# Patient Record
Sex: FEMALE | Race: WHITE | NOT HISPANIC OR LATINO | Employment: FULL TIME | URBAN - METROPOLITAN AREA
[De-identification: names, ages, dates, MRNs, and addresses within clinical notes are randomized per-mention and may not be internally consistent; named-entity substitution may affect disease eponyms.]

---

## 2017-01-06 ENCOUNTER — TELEPHONE (OUTPATIENT)
Dept: FAMILY MEDICINE | Facility: CLINIC | Age: 22
End: 2017-01-06

## 2017-01-06 ENCOUNTER — ALLIED HEALTH/NURSE VISIT (OUTPATIENT)
Dept: NURSING | Facility: CLINIC | Age: 22
End: 2017-01-06
Payer: COMMERCIAL

## 2017-01-06 DIAGNOSIS — Z30.42 ENCOUNTER FOR SURVEILLANCE OF INJECTABLE CONTRACEPTIVE: Primary | ICD-10-CM

## 2017-01-06 PROCEDURE — 99207 ZZC NO CHARGE NURSE ONLY: CPT

## 2017-01-06 PROCEDURE — 96372 THER/PROPH/DIAG INJ SC/IM: CPT

## 2017-01-06 RX ORDER — MEDROXYPROGESTERONE ACETATE 150 MG/ML
150 INJECTION, SUSPENSION INTRAMUSCULAR
Qty: 1 ML | Refills: 3 | OUTPATIENT
Start: 2017-01-06 | End: 2017-11-10

## 2017-01-06 NOTE — TELEPHONE ENCOUNTER
Patient came in today for a repeat depo provera injection. When asked if she has any side effects from it last time, she states that she has been feeling nauseous. Patient FYI, want to make sure that it is okay for her to continue this medication.

## 2017-01-06 NOTE — NURSING NOTE
Follow Up Injection    Patient returning during stated date range given at previous visit: Yes      If here at the correct interval:   BP Readings from Last 1 Encounters:   10/21/16 114/78     Wt Readings from Last 1 Encounters:   10/21/16 138 lb 8 oz (62.823 kg)       Last Pap/exam date: DUE FOR ONE      Side effects or problems with last injection?  Nausea, sent telephone messagfe to Dr. Bhatia regarding this.  Date range is given to patient for next dose: March 24-April 7, 2017    See Medication Note for administration information    Staff Sig: Abby Israel CMA

## 2017-03-24 ENCOUNTER — OFFICE VISIT (OUTPATIENT)
Dept: FAMILY MEDICINE | Facility: CLINIC | Age: 22
End: 2017-03-24
Payer: COMMERCIAL

## 2017-03-24 VITALS
DIASTOLIC BLOOD PRESSURE: 60 MMHG | HEART RATE: 81 BPM | TEMPERATURE: 98 F | SYSTOLIC BLOOD PRESSURE: 110 MMHG | WEIGHT: 144 LBS | BODY MASS INDEX: 23.14 KG/M2 | HEIGHT: 66 IN | RESPIRATION RATE: 14 BRPM

## 2017-03-24 DIAGNOSIS — Z23 ENCOUNTER FOR IMMUNIZATION: ICD-10-CM

## 2017-03-24 DIAGNOSIS — Z23 NEED FOR HPV VACCINE: ICD-10-CM

## 2017-03-24 DIAGNOSIS — Z12.4 SCREENING FOR MALIGNANT NEOPLASM OF CERVIX: ICD-10-CM

## 2017-03-24 DIAGNOSIS — Z00.00 ENCOUNTER FOR ROUTINE ADULT HEALTH EXAMINATION WITHOUT ABNORMAL FINDINGS: Primary | ICD-10-CM

## 2017-03-24 DIAGNOSIS — Z11.3 SCREENING EXAMINATION FOR VENEREAL DISEASE: ICD-10-CM

## 2017-03-24 LAB
ALBUMIN UR-MCNC: NEGATIVE MG/DL
APPEARANCE UR: CLEAR
BACTERIA #/AREA URNS HPF: ABNORMAL /HPF
BILIRUB UR QL STRIP: NEGATIVE
CHOLEST SERPL-MCNC: 186 MG/DL
COLOR UR AUTO: YELLOW
ERYTHROCYTE [DISTWIDTH] IN BLOOD BY AUTOMATED COUNT: 12.5 % (ref 10–15)
GLUCOSE SERPL-MCNC: 89 MG/DL (ref 70–99)
GLUCOSE UR STRIP-MCNC: NEGATIVE MG/DL
HCT VFR BLD AUTO: 39.1 % (ref 35–47)
HDLC SERPL-MCNC: 41 MG/DL
HGB BLD-MCNC: 12.8 G/DL (ref 11.7–15.7)
HGB UR QL STRIP: ABNORMAL
KETONES UR STRIP-MCNC: NEGATIVE MG/DL
LDLC SERPL CALC-MCNC: 118 MG/DL
LEUKOCYTE ESTERASE UR QL STRIP: NEGATIVE
MCH RBC QN AUTO: 28.7 PG (ref 26.5–33)
MCHC RBC AUTO-ENTMCNC: 32.7 G/DL (ref 31.5–36.5)
MCV RBC AUTO: 88 FL (ref 78–100)
NITRATE UR QL: NEGATIVE
NONHDLC SERPL-MCNC: 145 MG/DL
PH UR STRIP: 5.5 PH (ref 5–7)
PLATELET # BLD AUTO: 210 10E9/L (ref 150–450)
RBC # BLD AUTO: 4.46 10E12/L (ref 3.8–5.2)
RBC #/AREA URNS AUTO: ABNORMAL /HPF (ref 0–2)
SP GR UR STRIP: 1.02 (ref 1–1.03)
TRIGL SERPL-MCNC: 136 MG/DL
URN SPEC COLLECT METH UR: ABNORMAL
UROBILINOGEN UR STRIP-ACNC: 0.2 EU/DL (ref 0.2–1)
WBC # BLD AUTO: 8.3 10E9/L (ref 4–11)
WBC #/AREA URNS AUTO: ABNORMAL /HPF (ref 0–2)

## 2017-03-24 PROCEDURE — 81001 URINALYSIS AUTO W/SCOPE: CPT | Performed by: FAMILY MEDICINE

## 2017-03-24 PROCEDURE — 99395 PREV VISIT EST AGE 18-39: CPT | Mod: 25 | Performed by: FAMILY MEDICINE

## 2017-03-24 PROCEDURE — 85027 COMPLETE CBC AUTOMATED: CPT | Performed by: FAMILY MEDICINE

## 2017-03-24 PROCEDURE — G0145 SCR C/V CYTO,THINLAYER,RESCR: HCPCS | Performed by: FAMILY MEDICINE

## 2017-03-24 PROCEDURE — 87491 CHLMYD TRACH DNA AMP PROBE: CPT | Performed by: FAMILY MEDICINE

## 2017-03-24 PROCEDURE — 90649 4VHPV VACCINE 3 DOSE IM: CPT | Performed by: FAMILY MEDICINE

## 2017-03-24 PROCEDURE — 80061 LIPID PANEL: CPT | Performed by: FAMILY MEDICINE

## 2017-03-24 PROCEDURE — 87591 N.GONORRHOEAE DNA AMP PROB: CPT | Performed by: FAMILY MEDICINE

## 2017-03-24 PROCEDURE — 36415 COLL VENOUS BLD VENIPUNCTURE: CPT | Performed by: FAMILY MEDICINE

## 2017-03-24 PROCEDURE — 90472 IMMUNIZATION ADMIN EACH ADD: CPT | Performed by: FAMILY MEDICINE

## 2017-03-24 PROCEDURE — 82947 ASSAY GLUCOSE BLOOD QUANT: CPT | Performed by: FAMILY MEDICINE

## 2017-03-24 PROCEDURE — 90471 IMMUNIZATION ADMIN: CPT | Performed by: FAMILY MEDICINE

## 2017-03-24 PROCEDURE — 90715 TDAP VACCINE 7 YRS/> IM: CPT | Performed by: FAMILY MEDICINE

## 2017-03-24 PROCEDURE — 96372 THER/PROPH/DIAG INJ SC/IM: CPT | Performed by: FAMILY MEDICINE

## 2017-03-24 NOTE — NURSING NOTE
"Chief Complaint   Patient presents with     Physical       Initial /60  Pulse 81  Temp 98  F (36.7  C) (Tympanic)  Resp 14  Ht 5' 6\" (1.676 m)  Wt 144 lb (65.3 kg)  BMI 23.24 kg/m2 Estimated body mass index is 23.24 kg/(m^2) as calculated from the following:    Height as of this encounter: 5' 6\" (1.676 m).    Weight as of this encounter: 144 lb (65.3 kg).  Medication Reconciliation: complete     Dolly Rangel CMA      "

## 2017-03-24 NOTE — NURSING NOTE
Screening Questionnaire for Adult Immunization    Are you sick today?   No   Do you have allergies to medications, food, a vaccine component or latex?   No   Have you ever had a serious reaction after receiving a vaccination?   No   Do you have a long-term health problem with heart disease, lung disease, asthma, kidney disease, metabolic disease (e.g. diabetes), anemia, or other blood disorder?   No   Do you have cancer, leukemia, HIV/AIDS, or any other immune system problem?   No   In the past 3 months, have you taken medications that affect  your immune system, such as prednisone, other steroids, or anticancer drugs; drugs for the treatment of rheumatoid arthritis, Crohn s disease, or psoriasis; or have you had radiation treatments?   No   Have you had a seizure, or a brain or other nervous system problem?   No   During the past year, have you received a transfusion of blood or blood     products, or been given immune (gamma) globulin or antiviral drug?   No   For women: Are you pregnant or is there a chance you could become        pregnant during the next month?   No   Have you received any vaccinations in the past 4 weeks?   No     Immunization questionnaire answers were all negative.      MNVFC doesn't apply on this patient    Per orders of Dr. Bhatia, injection of tdap and hpv given by Dolly Rangel. Patient instructed to remain in clinic for 20 minutes afterwards, and to report any adverse reaction to me immediately.       Screening performed by Dolly Rangel on 3/24/2017 at 8:55 AM.    The following medication was given:     MEDICATION: Depo Provera 150mg  ROUTE: IM  SITE: RUQ - Gluteus  DOSE:  1 ml  LOT #: [id number]  :  [employer]   EXPIRATION DATE:  11/2019  NDC#: [address]-2    Dolly Rangel [facility]

## 2017-03-24 NOTE — PROGRESS NOTES
SUBJECTIVE:     CC: Prachi Ross is an 21 year old woman who presents for preventive health visit.     Healthy Habits:    Do you get at least three servings of calcium containing foods daily (dairy, green leafy vegetables, etc.)? yes    Amount of exercise or daily activities, outside of work: 0 day(s) per week    Problems taking medications regularly No    Medication side effects: No    Have you had an eye exam in the past two years? no    Do you see a dentist twice per year? no  Do you have sleep apnea, excessive snoring or daytime drowsiness?no      Today's PHQ-2 Score:   PHQ-2 ( 1999 Pfizer) 3/24/2017 3/5/2016   Q1: Little interest or pleasure in doing things 0 0   Q2: Feeling down, depressed or hopeless 0 0   PHQ-2 Score 0 0       Abuse: Current or Past(Physical, Sexual or Emotional)- No  Do you feel safe in your environment - Yes    Social History   Substance Use Topics     Smoking status: Never Smoker     Smokeless tobacco: Never Used     Alcohol use No     The patient does not drink >3 drinks per day nor >7 drinks per week.    No results for input(s): CHOL, HDL, LDL, TRIG, CHOLHDLRATIO, NHDL in the last 05748 hours.    Reviewed orders with patient.  Reviewed health maintenance and updated orders accordingly - Yes    Mammo Decision Support:  Mammogram not appropriate for this patient based on age.    Pertinent mammograms are reviewed under the imaging tab.  History of abnormal Pap smear: NO - age 21-29 PAP every 3 years recommended    Reviewed and updated as needed this visit by clinical staff  Tobacco  Meds         Reviewed and updated as needed this visit by Provider            ROS:  C: NEGATIVE for fever, chills, change in weight  I: NEGATIVE for worrisome rashes, moles or lesions  E: NEGATIVE for vision changes or irritation  ENT: NEGATIVE for ear, mouth and throat problems  RESP:NEGATIVE for significant cough or SOB and POSITIVE for Hx asthma  B: NEGATIVE for masses, tenderness or discharge  CV:  "NEGATIVE for chest pain, palpitations or peripheral edema  GI: NEGATIVE for nausea, abdominal pain, heartburn, or change in bowel habits  : NEGATIVE for unusual urinary or vaginal symptoms. Periods are regular.  M: NEGATIVE for significant arthralgias or myalgia  N: NEGATIVE for weakness, dizziness or paresthesias  P: NEGATIVE for changes in mood or affect    Problem list, Medication list, Allergies, and Medical/Social/Surgical histories reviewed in [facility] and updated as appropriate.  Labs reviewed in EPIC  OBJECTIVE:     /60  Pulse 81  Temp 98  F (36.7  C) (Tympanic)  Resp 14  Ht 5' 6\" (1.676 m)  Wt 144 lb (65.3 kg)  BMI 23.24 kg/m2  EXAM:  GENERAL: healthy, alert and no distress  EYES: Eyes grossly normal to inspection, PERRL and conjunctivae and sclerae normal  HENT: ear canals and TM's normal, nose and mouth without ulcers or lesions  NECK: no adenopathy, no asymmetry, masses, or scars and thyroid normal to palpation  RESP: lungs clear to auscultation - no rales, rhonchi or wheezes  BREAST: normal without masses, tenderness or nipple discharge and no palpable axillary masses or adenopathy  CV: regular rate and rhythm, normal S1 S2, no S3 or S4, no murmur, click or rub, no peripheral edema and peripheral pulses strong  ABDOMEN: soft, nontender, no hepatosplenomegaly, no masses and bowel sounds normal   (female): normal female external genitalia, normal urethral meatus , vaginal mucosa pink, moist, well rugated, normal cervix, adnexae, and uterus without masses. and Pap smear obtained.  GC/Chlamydia swab obtained from Cx  MS: no gross musculoskeletal defects noted, no edema  SKIN: no suspicious lesions or rashes  NEURO: Normal strength and tone, mentation intact and speech normal  PSYCH: mentation appears normal, affect normal/bright    ASSESSMENT/PLAN:     Prachi was seen today for physical.    Diagnoses and all orders for this visit:    Encounter for routine adult health examination without abnormal " "findings  -     CBC with platelets  -     UA with Microscopic  -     Glucose  -     Lipid panel reflex to direct LDL    Screening examination for venereal disease  -     NEISSERIA GONORRHOEA PCR  -     CHLAMYDIA TRACHOMATIS PCR    Screening for malignant neoplasm of cervix  -     Pap imaged thin layer screen only - recommended age 21 - 24 years    Need for HPV vaccine  -     HUMAN PAPILLOMAVIRUS VACCINE  -     VACCINE ADMINISTRATION, INITIAL    Encounter for immunization  -     TDAP VACCINE (ADACEL)  -     VACCINE ADMINISTRATION, EACH ADDITIONAL        COUNSELING:   Reviewed preventive health counseling, as reflected in patient instructions       Regular exercise       Healthy diet/nutrition       Vaccinated for: Human Papillomavirus and TDAP       Contraception    Pt can get the 2nd and 3rd HPV shots with her next Depo shots     reports that she has never smoked. She has never used smokeless tobacco.    Estimated body mass index is 23.24 kg/(m^2) as calculated from the following:    Height as of this encounter: 5' 6\" (1.676 m).    Weight as of this encounter: 144 lb (65.3 kg).       Counseling Resources:  ATP IV Guidelines  Pooled Cohorts Equation Calculator  Breast Cancer Risk Calculator  FRAX Risk Assessment  ICSI Preventive Guidelines  Dietary Guidelines for Americans, 2010  USDA's MyPlate  ASA Prophylaxis  Lung CA Screening    Tone Bhatia MD  [facility]  "

## 2017-03-24 NOTE — MR AVS SNAPSHOT
After Visit Summary   3/24/2017    Prachi Ross    MRN: [id number]           Patient Information     Date Of Birth          1995        Visit Information        Provider Department      3/24/2017 8:00 AM Tone Bhatia MD [facility]        Today's Diagnoses     Encounter for routine adult health examination without abnormal findings    -  1    Screening examination for venereal disease        Screening for malignant neoplasm of cervix        Need for HPV vaccine        Encounter for immunization          Care Instructions      Preventive Health Recommendations  Female Ages 18 to 25     Yearly exam:     See your health care provider every year in order to  o Review health changes.   o Discuss preventive care.    o Review your medicines if your doctor has prescribed any.      You should be tested each year for STDs (sexually transmitted diseases).       After age 20, talk to your provider about how often you should have cholesterol testing.      Starting at age 21, get a Pap test every three years. If you have an abnormal result, your doctor may have you test more often.      If you are at risk for diabetes, you should have a diabetes test (fasting glucose).     Shots:     Get a flu shot each year.     Get a tetanus shot every 10 years.     Consider getting the shot (vaccine) that prevents cervical cancer (Gardasil).    Nutrition:     Eat at least 5 servings of fruits and vegetables each day.    Eat whole-grain bread, whole-wheat pasta and brown rice instead of white grains and rice.    Talk to your provider about Calcium and Vitamin D.     Lifestyle    Exercise at least 150 minutes a week each week (30 minutes a day, 5 days a week). This will help you control your weight and prevent disease.    Limit alcohol to one drink per day.    No smoking.     Wear sunscreen to prevent skin cancer.    See your dentist every six months for an exam and cleaning.        Follow-ups  "after your visit        Follow-up notes from your care team     Return in about 1 year (around 3/24/2018).      Who to contact     If you have questions or need follow up information about today's clinic visit or your schedule please contact [facility] directly at [phone].  Normal or non-critical lab and imaging results will be communicated to you by MyChart, letter or phone within 4 business days after the clinic has received the results. If you do not hear from us within 7 days, please contact the clinic through MyChart or phone. If you have a critical or abnormal lab result, we will notify you by phone as soon as possible.  Submit refill requests through GigMasters or call your pharmacy and they will forward the refill request to us. Please allow 3 business days for your refill to be completed.          Additional Information About Your Visit        MyChart Information     GigMasters lets you send messages to your doctor, view your test results, renew your prescriptions, schedule appointments and more. To sign up, go to [email or web address] . Click on \"Log in\" on the left side of the screen, which will take you to the Welcome page. Then click on \"Sign up Now\" on the right side of the page.     You will be asked to enter the access code listed below, as well as some personal information. Please follow the directions to create your username and password.     Your access code is: [email or web address]  Expires: 2017  8:33 AM     Your access code will  in 90 days. If you need help or a new code, please call your [facility] or [phone].        Care EveryWhere ID     This is your Care EveryWhere ID. This could be used by other organizations to access your [place] medical records  [id number]        Your Vitals Were     Pulse Temperature Respirations Height BMI (Body Mass Index)       81 98  F (36.7  C) (Tympanic) 14 5' 6\" (1.676 m) 23.24 kg/m2        Blood Pressure from Last " 3 Encounters:   03/24/17 110/60   10/21/16 114/78   05/20/16 110/62    Weight from Last 3 Encounters:   03/24/17 144 lb (65.3 kg)   10/21/16 138 lb 8 oz (62.8 kg)   05/20/16 124 lb (56.2 kg)              We Performed the Following     CBC with platelets     CHLAMYDIA TRACHOMATIS PCR     Glucose     HUMAN PAPILLOMAVIRUS VACCINE     Lipid panel reflex to direct LDL     NEISSERIA GONORRHOEA PCR     Pap imaged thin layer screen only - recommended age 21 - 24 years     TDAP VACCINE (ADACEL)     UA with Microscopic     VACCINE ADMINISTRATION, EACH ADDITIONAL     VACCINE ADMINISTRATION, INITIAL        Primary Care Provider Office Phone # Fax #    Tone Bhatia [phone]       [facility] [address] [facility] [address]        Thank you!     Thank you for choosing [facility]  for your care. Our goal is always to provide you with excellent care. Hearing back from our patients is one way we can continue to improve our services. Please take a few minutes to complete the written survey that you may receive in the mail after your visit with us. Thank you!             Your Updated Medication List - Protect others around you: Learn how to safely use, store and throw away your medicines at www.disposemymeds.org.          This list is accurate as of: 3/24/17  8:33 AM.  Always use your most recent med list.                   Brand Name Dispense Instructions for use    albuterol 108 (90 BASE) MCG/ACT Inhaler   Generic drug:  albuterol          medroxyPROGESTERone 150 MG/ML injection    DEPO-PROVERA    1 mL    Inject 1 mL (150 mg) into the muscle every 3 months

## 2017-03-27 LAB
C TRACH DNA SPEC QL NAA+PROBE: NORMAL
N GONORRHOEA DNA SPEC QL NAA+PROBE: NORMAL
SPECIMEN SOURCE: NORMAL
SPECIMEN SOURCE: NORMAL

## 2017-03-28 LAB
COPATH REPORT: NORMAL
PAP: NORMAL

## 2017-06-09 ENCOUNTER — ALLIED HEALTH/NURSE VISIT (OUTPATIENT)
Dept: NURSING | Facility: CLINIC | Age: 22
End: 2017-06-09
Payer: COMMERCIAL

## 2017-06-09 PROCEDURE — 99207 ZZC NO CHARGE NURSE ONLY: CPT

## 2017-06-09 PROCEDURE — 96372 THER/PROPH/DIAG INJ SC/IM: CPT

## 2017-06-09 NOTE — MR AVS SNAPSHOT
After Visit Summary   6/9/2017    Prachi Ross    MRN: [id number]           Patient Information     Date Of Birth          1995        Visit Information        Provider Department      6/9/2017 10:00 AM BX NURSE [facility]        Today's Diagnoses     Contraception    -  1       Follow-ups after your visit        Who to contact     If you have questions or need follow up information about today's clinic visit or your schedule please contact [facility] directly at [phone].  Normal or non-critical lab and imaging results will be communicated to you by MyChart, letter or phone within 4 business days after the clinic has received the results. If you do not hear from us within 7 days, please contact the clinic through VideoBurstt or phone. If you have a critical or abnormal lab result, we will notify you by phone as soon as possible.  Submit refill requests through Docker or call your pharmacy and they will forward the refill request to us. Please allow 3 business days for your refill to be completed.          Additional Information About Your Visit        MyChart Information     Docker gives you secure access to your electronic health record. If you see a primary care provider, you can also send messages to your care team and make appointments. If you have questions, please call your primary care clinic.  If you do not have a primary care provider, please call [phone] and they will assist you.        Care EveryWhere ID     This is your Care EveryWhere ID. This could be used by other organizations to access your [place] medical records  [id number]         Blood Pressure from Last 3 Encounters:   03/24/17 110/60   10/21/16 114/78   05/20/16 110/62    Weight from Last 3 Encounters:   03/24/17 144 lb (65.3 kg)   10/21/16 138 lb 8 oz (62.8 kg)   05/20/16 124 lb (56.2 kg)              We Performed the Following     THER/PROPH/DIAG INJ,  SC/IM        Primary Care Provider Office Phone # Fax #    Tone Bhatia [phone]       [facility] [address] [address] [facility] [address]        Thank you!     Thank you for choosing [facility]  for your care. Our goal is always to provide you with excellent care. Hearing back from our patients is one way we can continue to improve our services. Please take a few minutes to complete the written survey that you may receive in the mail after your visit with us. Thank you!             Your Updated Medication List - Protect others around you: Learn how to safely use, store and throw away your medicines at www.disposemymeds.org.          This list is accurate as of: 6/9/17 10:02 AM.  Always use your most recent med list.                   Brand Name Dispense Instructions for use    albuterol 108 (90 BASE) MCG/ACT Inhaler   Generic drug:  albuterol          medroxyPROGESTERone 150 MG/ML injection    DEPO-PROVERA    1 mL    Inject 1 mL (150 mg) into the muscle every 3 months

## 2017-06-09 NOTE — PROGRESS NOTES
Follow Up Injection    Patient returning during stated date range given at previous visit: Yes      If here at the correct interval:   BP Readings from Last 1 Encounters:   03/24/17 110/60     Wt Readings from Last 1 Encounters:   03/24/17 144 lb (65.3 kg)       Last Pap/exam date: 3/24/17      Side effects or problems with last injection?  No.  Date range is given to patient for next dose: August 25-September 8th 2017    See Medication Note for administration information    Staff Sig: Abby Israel CMA

## 2017-08-25 ENCOUNTER — ALLIED HEALTH/NURSE VISIT (OUTPATIENT)
Dept: NURSING | Facility: CLINIC | Age: 22
End: 2017-08-25
Payer: COMMERCIAL

## 2017-08-25 DIAGNOSIS — Z30.42 ENCOUNTER FOR SURVEILLANCE OF INJECTABLE CONTRACEPTIVE: Primary | ICD-10-CM

## 2017-08-25 PROCEDURE — 99207 ZZC NO CHARGE NURSE ONLY: CPT

## 2017-08-25 PROCEDURE — 96372 THER/PROPH/DIAG INJ SC/IM: CPT

## 2017-08-25 NOTE — MR AVS SNAPSHOT
After Visit Summary   8/25/2017    Prachi Ross    MRN: [id number]           Patient Information     Date Of Birth          1995        Visit Information        Provider Department      8/25/2017 4:00 PM BX NURSE [facility]        Today's Diagnoses     Encounter for surveillance of injectable contraceptive    -  1       Follow-ups after your visit        Who to contact     If you have questions or need follow up information about today's clinic visit or your schedule please contact [facility] directly at [phone].  Normal or non-critical lab and imaging results will be communicated to you by Divergencehart, letter or phone within 4 business days after the clinic has received the results. If you do not hear from us within 7 days, please contact the clinic through Jobydut or phone. If you have a critical or abnormal lab result, we will notify you by phone as soon as possible.  Submit refill requests through Define My Style or call your pharmacy and they will forward the refill request to us. Please allow 3 business days for your refill to be completed.          Additional Information About Your Visit        MyChart Information     Define My Style gives you secure access to your electronic health record. If you see a primary care provider, you can also send messages to your care team and make appointments. If you have questions, please call your primary care clinic.  If you do not have a primary care provider, please call [phone] and they will assist you.        Care EveryWhere ID     This is your Care EveryWhere ID. This could be used by other organizations to access your [place] medical records  [id number]         Blood Pressure from Last 3 Encounters:   03/24/17 110/60   10/21/16 114/78   05/20/16 110/62    Weight from Last 3 Encounters:   03/24/17 144 lb (65.3 kg)   10/21/16 138 lb 8 oz (62.8 kg)   05/20/16 124 lb (56.2 kg)              We  Performed the Following     THER/PROPH/DIAG INJ, SC/IM        Primary Care Provider Office Phone # Fax #    Tone Bhatia [phone] [address] [facility] [address]        Equal Access to Services     LUIS ENRIQUE GEIGER : Hadii aad ku hadasho Soomaali, waaxda luqadaha, qaybta kaalmada adeegyada, waxay idiin hayaan adeeg khlizetchris lameghan . So [facility] [phone].    ATENCIÓN: Si habla español, tiene a naranjo disposición servicios gratuitos de asistencia lingüística. Llame al [phone].    We comply with applicable federal civil rights laws and [place] laws. We do not discriminate on the basis of race, color, national origin, age, disability sex, sexual orientation or gender identity.            Thank you!     Thank you for choosing [facility]  for your care. Our goal is always to provide you with excellent care. Hearing back from our patients is one way we can continue to improve our services. Please take a few minutes to complete the written survey that you may receive in the mail after your visit with us. Thank you!             Your Updated Medication List - Protect others around you: Learn how to safely use, store and throw away your medicines at www.disposemymeds.org.          This list is accurate as of: 8/25/17 11:59 PM.  Always use your most recent med list.                   Brand Name Dispense Instructions for use Diagnosis    medroxyPROGESTERone 150 MG/ML injection    DEPO-PROVERA    1 mL    Inject 1 mL (150 mg) into the muscle every 3 months    Encounter for surveillance of injectable contraceptive       PROAIR  (90 BASE) MCG/ACT Inhaler   Generic drug:  albuterol

## 2017-08-26 NOTE — PROGRESS NOTES
Follow Up Injection    Patient returning during stated date range given at previous visit: Yes      If here at the correct interval:   BP Readings from Last 1 Encounters:   03/24/17 110/60     Wt Readings from Last 1 Encounters:   03/24/17 144 lb (65.3 kg)             Side effects or problems with last injection?  No.  Date range is given to patient for next dose: 11/10/17-11/24/17    See Medication Note for administration information    Staff Sig: Dang Newell CMA

## 2017-11-10 ENCOUNTER — OFFICE VISIT (OUTPATIENT)
Dept: FAMILY MEDICINE | Facility: CLINIC | Age: 22
End: 2017-11-10
Payer: COMMERCIAL

## 2017-11-10 ENCOUNTER — TELEPHONE (OUTPATIENT)
Dept: FAMILY MEDICINE | Facility: CLINIC | Age: 22
End: 2017-11-10

## 2017-11-10 VITALS
SYSTOLIC BLOOD PRESSURE: 110 MMHG | HEART RATE: 82 BPM | TEMPERATURE: 97.8 F | WEIGHT: 156 LBS | DIASTOLIC BLOOD PRESSURE: 60 MMHG | BODY MASS INDEX: 25.18 KG/M2 | RESPIRATION RATE: 14 BRPM

## 2017-11-10 DIAGNOSIS — N76.0 BACTERIAL VAGINOSIS: Primary | ICD-10-CM

## 2017-11-10 DIAGNOSIS — N94.10 DYSPAREUNIA, FEMALE: Primary | ICD-10-CM

## 2017-11-10 DIAGNOSIS — B96.89 BACTERIAL VAGINOSIS: Primary | ICD-10-CM

## 2017-11-10 DIAGNOSIS — Z30.42 ENCOUNTER FOR SURVEILLANCE OF INJECTABLE CONTRACEPTIVE: ICD-10-CM

## 2017-11-10 LAB
SPECIMEN SOURCE: ABNORMAL
WET PREP SPEC: ABNORMAL

## 2017-11-10 PROCEDURE — 87591 N.GONORRHOEAE DNA AMP PROB: CPT | Performed by: PHYSICIAN ASSISTANT

## 2017-11-10 PROCEDURE — 87210 SMEAR WET MOUNT SALINE/INK: CPT | Performed by: PHYSICIAN ASSISTANT

## 2017-11-10 PROCEDURE — 99213 OFFICE O/P EST LOW 20 MIN: CPT | Performed by: PHYSICIAN ASSISTANT

## 2017-11-10 PROCEDURE — 87491 CHLMYD TRACH DNA AMP PROBE: CPT | Performed by: PHYSICIAN ASSISTANT

## 2017-11-10 RX ORDER — MEDROXYPROGESTERONE ACETATE 150 MG/ML
150 INJECTION, SUSPENSION INTRAMUSCULAR
Qty: 1 ML | Refills: 3 | OUTPATIENT
Start: 2017-11-10 | End: 2022-10-04

## 2017-11-10 RX ORDER — METRONIDAZOLE 500 MG/1
500 TABLET ORAL 2 TIMES DAILY
Qty: 14 TABLET | Refills: 0 | Status: SHIPPED | OUTPATIENT
Start: 2017-11-10 | End: 2018-07-06

## 2017-11-10 NOTE — NURSING NOTE
The following medication was given:     MEDICATION: Depo Provera 150mg  ROUTE: IM  SITE: [facility]  DOSE: 1 ml  LOT #: [id number]  :  Betsey  EXPIRATION DATE:  4/1/19  NDC#: 6896-5373-13    Dolly Rangel CMA

## 2017-11-10 NOTE — NURSING NOTE
"Chief Complaint   Patient presents with     Contraception     depo provera        Initial /60  Pulse 82  Temp 97.8  F (36.6  C) (Tympanic)  Resp 14  Wt 156 lb (70.8 kg)  BMI 25.18 kg/m2 Estimated body mass index is 25.18 kg/(m^2) as calculated from the following:    Height as of 3/24/17: 5' 6\" (1.676 m).    Weight as of this encounter: 156 lb (70.8 kg).  Medication Reconciliation: complete     Dolly Rangel CMA         "

## 2017-11-10 NOTE — MR AVS SNAPSHOT
After Visit Summary   11/10/2017    Prachi Ross    MRN: [id number]           Patient Information     Date Of Birth          1995        Visit Information        Provider Department      11/10/2017 4:10 PM Siegler, Nicole Joy, PA-C [facility]        Today's Diagnoses     Dyspareunia, female    -  1    Encounter for surveillance of injectable contraceptive           Follow-ups after your visit        Who to contact     If you have questions or need follow up information about today's clinic visit or your schedule please contact [facility] directly at [phone].  Normal or non-critical lab and imaging results will be communicated to you by Eventyardhart, letter or phone within 4 business days after the clinic has received the results. If you do not hear from us within 7 days, please contact the clinic through Eventyardhart or phone. If you have a critical or abnormal lab result, we will notify you by phone as soon as possible.  Submit refill requests through [employer] or call your pharmacy and they will forward the refill request to us. Please allow 3 business days for your refill to be completed.          Additional Information About Your Visit        MyChart Information     [employer] gives you secure access to your electronic health record. If you see a primary care provider, you can also send messages to your care team and make appointments. If you have questions, please call your primary care clinic.  If you do not have a primary care provider, please call [phone] and they will assist you.        Care EveryWhere ID     This is your Care EveryWhere ID. This could be used by other organizations to access your [place] medical records  [id number]        Your Vitals Were     Pulse Temperature Respirations BMI (Body Mass Index)          82 97.8  F (36.6  C) (Tympanic) 14 25.18 kg/m2         Blood Pressure from Last 3 Encounters:   11/10/17 110/60    03/24/17 110/60   10/21/16 114/78    Weight from Last 3 Encounters:   11/10/17 156 lb (70.8 kg)   03/24/17 144 lb (65.3 kg)   10/21/16 138 lb 8 oz (62.8 kg)              We Performed the Following     Chlamydia trachomatis PCR     Neisseria gonorrhoeae PCR     Wet prep          Where to get your medicines      Some of these will need a paper prescription and others can be bought over the counter.  Ask your nurse if you have questions.     You don't need a prescription for these medications     medroxyPROGESTERone 150 MG/ML injection          Primary Care Provider Office Phone # Fax #    Tone Bhatia [phone]       [address] [facility] [address]        Equal Access to Services     LUIS ENRIQUE GEIGER : Raoul dawkinso Soconnor, waaxda luqadaha, qaybta kaalmada adecelesteyajarred, pauline cobos . So [facility] [phone].    ATENCIÓN: Si habla español, tiene a naranjo disposición servicios gratuitos de asistencia lingüística. Llame al [phone].    We comply with applicable federal civil rights laws and [place] laws. We do not discriminate on the basis of race, color, national origin, age, disability, sex, sexual orientation, or gender identity.            Thank you!     Thank you for choosing [facility]  for your care. Our goal is always to provide you with excellent care. Hearing back from our patients is one way we can continue to improve our services. Please take a few minutes to complete the written survey that you may receive in the mail after your visit with us. Thank you!             Your Updated Medication List - Protect others around you: Learn how to safely use, store and throw away your medicines at www.disposemymeds.org.          This list is accurate as of: 11/10/17  4:37 PM.  Always use your most recent med list.                   Brand Name Dispense Instructions for use Diagnosis    medroxyPROGESTERone 150 MG/ML injection     DEPO-PROVERA    1 mL    Inject 1 mL (150 mg) into the muscle every 3 months    Encounter for surveillance of injectable contraceptive       PROAIR  (90 BASE) MCG/ACT Inhaler   Generic drug:  albuterol

## 2017-11-10 NOTE — PROGRESS NOTES
SUBJECTIVE:   Prachi Ross is a 22 year old female who presents to clinic today for the following health issues:    Vaginal Symptoms      Duration: 6 months    Description  pain with intercourse    Intensity:  moderate    Accompanying signs and symptoms (fever/dysuria/abdominal or back pain): None    History  Sexually active: yes, single partner, contraception - Depo Provera  Possibility of pregnancy: No  Recent antibiotic use: no     Precipitating or alleviating factors: None  Therapies tried and outcome: none      Pain in with intercourse inside the vagina that is aching and sore only during intercourse. Immediately afterward she does not have any pain. She has had no bleeding. No menstrual cycles due to depo use. No change with position change. No pelvic or back pain. She does feel like she is a little bit dry inside. She has used lubricants and it did at first but it seems to dry out very quickly.     She has a vaginal odor without discharge that she has noted.   She is sexually active with one male partner and no known exposure to STD.       Problem list and histories reviewed & adjusted, as indicated.  Additional history: as documented    Patient Active Problem List   Diagnosis     CARDIOVASCULAR SCREENING; LDL GOAL LESS THAN 160     Encounter for surveillance of injectable contraceptive     History reviewed. No pertinent surgical history.    Social History   Substance Use Topics     Smoking status: Never Smoker     Smokeless tobacco: Never Used     Alcohol use 0.0 oz/week     0 Standard drinks or equivalent per week     Family History   Problem Relation Age of Onset     Hypertension Mother      Coronary Artery Disease Maternal Grandmother      DIABETES No family hx of      Hyperlipidemia No family hx of      CEREBROVASCULAR DISEASE No family hx of      Breast Cancer No family hx of      Colon Cancer No family hx of      Prostate Cancer No family hx of      Other Cancer No family hx of      Depression No  family hx of      Anxiety Disorder No family hx of      MENTAL ILLNESS No family hx of      Substance Abuse No family hx of      Anesthesia Reaction No family hx of      Asthma No family hx of      OSTEOPOROSIS No family hx of      Genetic Disorder No family hx of      Thyroid Disease No family hx of      Obesity No family hx of      Unknown/Adopted No family hx of          Current Outpatient Prescriptions   Medication Sig Dispense Refill     medroxyPROGESTERone (DEPO-PROVERA) 150 MG/ML injection Inject 1 mL (150 mg) into the muscle every 3 months 1 mL 3     ALBUTEROL 108 (90 BASE) MCG/ACT inhaler   7     [DISCONTINUED] medroxyPROGESTERone (DEPO-PROVERA) 150 MG/ML injection Inject 1 mL (150 mg) into the muscle every 3 months 1 mL 3         Reviewed and updated as needed this visit by clinical staffTobacco  Meds  Med Hx  Surg Hx  Fam Hx  Soc Hx      Reviewed and updated as needed this visit by Provider         ROS:  Constitutional, HEENT, cardiovascular, pulmonary, gi and gu systems are negative, except as otherwise noted.      OBJECTIVE:   /60  Pulse 82  Temp 97.8  F (36.6  C) (Tympanic)  Resp 14  Wt 156 lb (70.8 kg)  BMI 25.18 kg/m2  Body mass index is 25.18 kg/(m^2).  GENERAL: healthy, alert and no distress  ABDOMEN: soft, nontender, no hepatosplenomegaly, no masses and bowel sounds normal   (female): normal female external genitalia, normal urethral meatus, vaginal mucosa, normal cervix/adnexa/uterus without masses. Scant white, curdy discharge. She did experience discomfort with insertion of the speculum in the vaginal introitus and on the vaginal wall  SKIN: no suspicious lesions or rashes  BACK: no CVA tenderness, no paralumbar tenderness  PSYCH: mentation appears normal, affect normal/bright    Diagnostic Test Results:  pending    ASSESSMENT/PLAN:       ICD-10-CM    1. Dyspareunia, female N94.10 Wet prep     Neisseria gonorrhoeae PCR     Chlamydia trachomatis PCR   2. Encounter for  surveillance of injectable contraceptive Z30.42 medroxyPROGESTERone (DEPO-PROVERA) 150 MG/ML injection       Depo given today. No contraindications.     Pending labs regarding her dyspareunia. We will discuss any findings that are helpful, if nothing found will consider sending for further eval with ob/gyn. She will continue use of personal lubricants and dry several different ones to see if there are any that work better for her. She agrees.     Nicole Joy Siegler, PA-C  [facility]

## 2017-11-10 NOTE — TELEPHONE ENCOUNTER
Clue cells noted on wet prep after patient left the clinic. This does correlate somewhat with her symptoms. LVM with this information and that I sent a script to her pharmacy. Nicole Joy Siegler, PA-C

## 2017-11-12 LAB
C TRACH DNA SPEC QL NAA+PROBE: NEGATIVE
N GONORRHOEA DNA SPEC QL NAA+PROBE: NEGATIVE
SPECIMEN SOURCE: NORMAL
SPECIMEN SOURCE: NORMAL

## 2018-01-26 ENCOUNTER — ALLIED HEALTH/NURSE VISIT (OUTPATIENT)
Dept: NURSING | Facility: CLINIC | Age: 23
End: 2018-01-26
Payer: COMMERCIAL

## 2018-01-26 VITALS — WEIGHT: 158 LBS | DIASTOLIC BLOOD PRESSURE: 60 MMHG | SYSTOLIC BLOOD PRESSURE: 94 MMHG | BODY MASS INDEX: 25.5 KG/M2

## 2018-01-26 DIAGNOSIS — Z30.9 CONTRACEPTIVE MANAGEMENT: Primary | ICD-10-CM

## 2018-01-26 PROCEDURE — 96372 THER/PROPH/DIAG INJ SC/IM: CPT

## 2018-01-26 PROCEDURE — 99207 ZZC NO CHARGE LOS: CPT

## 2018-01-26 NOTE — MR AVS SNAPSHOT
After Visit Summary   1/26/2018    Prachi Ross    MRN: [id number]           Patient Information     Date Of Birth          1995        Visit Information        Provider Department      1/26/2018 4:00 PM BX NURSE [facility]        Today's Diagnoses     Contraceptive management    -  1       Follow-ups after your visit        Who to contact     If you have questions or need follow up information about today's clinic visit or your schedule please contact [facility] directly at [phone].  Normal or non-critical lab and imaging results will be communicated to you by Hybrenthart, letter or phone within 4 business days after the clinic has received the results. If you do not hear from us within 7 days, please contact the clinic through [employer] or phone. If you have a critical or abnormal lab result, we will notify you by phone as soon as possible.  Submit refill requests through [employer] or call your pharmacy and they will forward the refill request to us. Please allow 3 business days for your refill to be completed.          Additional Information About Your Visit        MyChart Information     [employer] gives you secure access to your electronic health record. If you see a primary care provider, you can also send messages to your care team and make appointments. If you have questions, please call your primary care clinic.  If you do not have a primary care provider, please call [phone] and they will assist you.        Care EveryWhere ID     This is your Care EveryWhere ID. This could be used by other organizations to access your [place] medical records  [id number]        Your Vitals Were     BMI (Body Mass Index)                   25.5 kg/m2            Blood Pressure from Last 3 Encounters:   01/26/18 94/60   11/10/17 110/60   03/24/17 110/60    Weight from Last 3 Encounters:   01/26/18 158 lb (71.7 kg)   11/10/17 156 lb (70.8 kg)    03/24/17 144 lb (65.3 kg)              We Performed the Following     C Medroxyprogesterone inj/1mg (J-Code)     THER/PROPH/DIAG INJ, SC/IM        Primary Care Provider Office Phone # Fax #    Tone Bhatia [phone] [address] [facility] [address]        Equal Access to Services     SUNITA [facility] : Hadii aad ku hadasho Soomaali, waaxda luqadaha, qaybta kaalmada adeegyada, waxay idiin hayaan adeeg kharash la'aan ah. So [facility] [phone].    ATENCIÓN: Si habla esphéctor, tiene a naranjo disposición servicios gratuitos de asistencia lingüística. Llame al [phone].    We comply with applicable federal civil rights laws and [place] laws. We do not discriminate on the basis of race, color, national origin, age, disability, sex, sexual orientation, or gender identity.            Thank you!     Thank you for choosing [facility]  for your care. Our goal is always to provide you with excellent care. Hearing back from our patients is one way we can continue to improve our services. Please take a few minutes to complete the written survey that you may receive in the mail after your visit with us. Thank you!             Your Updated Medication List - Protect others around you: Learn how to safely use, store and throw away your medicines at www.disposemymeds.org.          This list is accurate as of 1/26/18  4:20 PM.  Always use your most recent med list.                   Brand Name Dispense Instructions for use Diagnosis    medroxyPROGESTERone 150 MG/ML injection    DEPO-PROVERA    1 mL    Inject 1 mL (150 mg) into the muscle every 3 months    Encounter for surveillance of injectable contraceptive       metroNIDAZOLE 500 MG tablet    FLAGYL    14 tablet    Take 1 tablet (500 mg) by mouth 2 times daily    Bacterial vaginosis       PROAIR  (90 BASE) MCG/ACT Inhaler   Generic drug:  albuterol

## 2018-01-26 NOTE — PROGRESS NOTES
Follow Up Injection    Patient returning during stated date range given at previous visit: Yes      If here at the correct interval:   BP Readings from Last 1 Encounters:   01/26/18 94/60     Wt Readings from Last 1 Encounters:   01/26/18 158 lb (71.7 kg)       Last Pap/exam date: 3/24/17      Side effects or problems with last injection?  No.  Date range is given to patient for next dose: April 13-27    See Medication Note for administration information    Staff Sig: Dolly Rangel CMA

## 2018-01-26 NOTE — NURSING NOTE
The following medication was given:     MEDICATION: Depo Provera 150mg  ROUTE: IM  SITE: [facility]  DOSE: 1 ml  LOT #: [id number]  :  Betsey  EXPIRATION DATE:  6/2019  NDC#: 3639-7955-70    Dolly Rangel CMA

## 2018-04-13 ENCOUNTER — ALLIED HEALTH/NURSE VISIT (OUTPATIENT)
Dept: NURSING | Facility: CLINIC | Age: 23
End: 2018-04-13
Payer: COMMERCIAL

## 2018-04-13 VITALS — SYSTOLIC BLOOD PRESSURE: 100 MMHG | BODY MASS INDEX: 26.15 KG/M2 | WEIGHT: 162 LBS | DIASTOLIC BLOOD PRESSURE: 60 MMHG

## 2018-04-13 PROCEDURE — 99207 ZZC NO CHARGE LOS: CPT

## 2018-04-13 PROCEDURE — 96372 THER/PROPH/DIAG INJ SC/IM: CPT

## 2018-04-13 NOTE — PROGRESS NOTES
Follow Up Injection    Patient returning during stated date range given at previous visit: Yes      If here at the correct interval:   BP Readings from Last 1 Encounters:   04/13/18 100/60     Wt Readings from Last 1 Encounters:   04/13/18 162 lb (73.5 kg)       Last Pap/exam date: 11/10/17      Side effects or problems with last injection?  No.  Date range is given to patient for next dose: June 29th-July 13th    See Medication Note for administration information    Staff Sig: Dolly Rangel CMA

## 2018-04-13 NOTE — MR AVS SNAPSHOT
After Visit Summary   4/13/2018    Prachi Ross    MRN: [id number]           Patient Information     Date Of Birth          1995        Visit Information        Provider Department      4/13/2018 4:00 PM BX NURSE [facility]        Today's Diagnoses     Contraception    -  1       Follow-ups after your visit        Who to contact     If you have questions or need follow up information about today's clinic visit or your schedule please contact [facility] directly at [phone].  Normal or non-critical lab and imaging results will be communicated to you by Integrated Development Enterprisehart, letter or phone within 4 business days after the clinic has received the results. If you do not hear from us within 7 days, please contact the clinic through Dat[employer]ogt or phone. If you have a critical or abnormal lab result, we will notify you by phone as soon as possible.  Submit refill requests through [employer] or call your pharmacy and they will forward the refill request to us. Please allow 3 business days for your refill to be completed.          Additional Information About Your Visit        MyChart Information     [employer] gives you secure access to your electronic health record. If you see a primary care provider, you can also send messages to your care team and make appointments. If you have questions, please call your primary care clinic.  If you do not have a primary care provider, please call [phone] and they will assist you.        Care EveryWhere ID     This is your Care EveryWhere ID. This could be used by other organizations to access your [place] medical records  [id number]        Your Vitals Were     BMI (Body Mass Index)                   26.15 kg/m2            Blood Pressure from Last 3 Encounters:   04/13/18 100/60   01/26/18 94/60   11/10/17 110/60    Weight from Last 3 Encounters:   04/13/18 162 lb (73.5 kg)   01/26/18 158 lb (71.7 kg)   11/10/17 156  lb (70.8 kg)              We Performed the Following     Medroxyprogesterone inj/1mg (Depo Provera J-Code)     THER/PROPH/DIAG INJ, SC/IM        Primary Care Provider Office Phone # Fax #    Tone Bhatia [phone] [address] [facility] [address]        Equal Access to Services     [facility] : Hadii aad ku hadasho Soomaali, waaxda luqadaha, qaybta kaalmada adeegyada, waxay idiin hayaan adeeg kharash la'aan . So [facility] [phone].    ATENCIÓN: Si habla español, tiene a naranjo disposición servicios gratuitos de asistencia lingüística. Thad al [phone].    We comply with applicable federal civil rights laws and [place] laws. We do not discriminate on the basis of race, color, national origin, age, disability, sex, sexual orientation, or gender identity.            Thank you!     Thank you for choosing [facility]  for your care. Our goal is always to provide you with excellent care. Hearing back from our patients is one way we can continue to improve our services. Please take a few minutes to complete the written survey that you may receive in the mail after your visit with us. Thank you!             Your Updated Medication List - Protect others around you: Learn how to safely use, store and throw away your medicines at www.disposemymeds.org.          This list is accurate as of 4/13/18  4:30 PM.  Always use your most recent med list.                   Brand Name Dispense Instructions for use Diagnosis    medroxyPROGESTERone 150 MG/ML injection    DEPO-PROVERA    1 mL    Inject 1 mL (150 mg) into the muscle every 3 months    Encounter for surveillance of injectable contraceptive       metroNIDAZOLE 500 MG tablet    FLAGYL    14 tablet    Take 1 tablet (500 mg) by mouth 2 times daily    Bacterial vaginosis       PROAIR  (90 Base) MCG/ACT Inhaler   Generic drug:  albuterol

## 2018-04-13 NOTE — NURSING NOTE
The following medication was given:     MEDICATION: Depo Provera 150mg  ROUTE: IM  SITE: Vastus Lateralis - Left  DOSE: 1 ml  LOT #: [id number]  :  Intio   EXPIRATION DATE:  5/2020  NDC#: [address]-9    Dolly Rangel CMA

## 2018-05-18 ENCOUNTER — TRANSFERRED RECORDS (OUTPATIENT)
Dept: HEALTH INFORMATION MANAGEMENT | Facility: CLINIC | Age: 23
End: 2018-05-18

## 2018-07-06 ENCOUNTER — OFFICE VISIT (OUTPATIENT)
Dept: OBGYN | Facility: CLINIC | Age: 23
End: 2018-07-06
Payer: COMMERCIAL

## 2018-07-06 VITALS — BODY MASS INDEX: 26.79 KG/M2 | WEIGHT: 166 LBS | DIASTOLIC BLOOD PRESSURE: 80 MMHG | SYSTOLIC BLOOD PRESSURE: 100 MMHG

## 2018-07-06 DIAGNOSIS — Z30.42 ENCOUNTER FOR DEPO-PROVERA CONTRACEPTION: ICD-10-CM

## 2018-07-06 DIAGNOSIS — R10.2 VAGINAL PAIN: Primary | ICD-10-CM

## 2018-07-06 LAB
SPECIMEN SOURCE: NORMAL
WET PREP SPEC: NORMAL

## 2018-07-06 PROCEDURE — 87210 SMEAR WET MOUNT SALINE/INK: CPT | Performed by: ADVANCED PRACTICE MIDWIFE

## 2018-07-06 PROCEDURE — 99203 OFFICE O/P NEW LOW 30 MIN: CPT | Mod: 25 | Performed by: ADVANCED PRACTICE MIDWIFE

## 2018-07-06 PROCEDURE — 96372 THER/PROPH/DIAG INJ SC/IM: CPT | Performed by: ADVANCED PRACTICE MIDWIFE

## 2018-07-06 NOTE — PROGRESS NOTES
SUBJECTIVE:                                                   Prachi Ross is a 22 year old who presents to clinic today for the following health issue(s):  Patient presents with:  Imm/Inj: Due for depo  Vaginal Problem: Pain with intercourse    States that 2 months ago she was diagnosed with bacterial vaginosis and thinks it may have returned.  States she has pain with intercourse.  Denies any bleeding with intercourse.  Denies any changes in vaginal discharge or odor.  Patient also needs her Depo Provera injection today.      No LMP recorded (lmp unknown). Patient has had an injection.  Menstrual History: amenorrhea due to Depo Provera  Patient is sexually active  No obstetric history on file..  Using depo or other injectable for contraception.   Health maintenance updated:  no  STI infx testing offered:  Declined      Problem list and histories reviewed & adjusted, as indicated.  Additional history: as documented.    Patient Active Problem List   Diagnosis     CARDIOVASCULAR SCREENING; LDL GOAL LESS THAN 160     Encounter for surveillance of injectable contraceptive     History reviewed. No pertinent surgical history.   Social History   Substance Use Topics     Smoking status: Never Smoker     Smokeless tobacco: Never Used     Alcohol use 0.0 oz/week     0 Standard drinks or equivalent per week      Problem (# of Occurrences) Relation (Name,Age of Onset)    Coronary Artery Disease (1) Maternal Grandmother    Hypertension (1) Mother       Negative family history of: Diabetes, Hyperlipidemia, Cerebrovascular Disease, Breast Cancer, Colon Cancer, Prostate Cancer, Other Cancer, Depression, Anxiety Disorder, Mental Illness, Substance Abuse, Anesthesia Reaction, Asthma, Osteoperosis, Genetic Disorder, Thyroid Disease, Obesity, Unknown/Adopted            Current Outpatient Prescriptions   Medication Sig     ALBUTEROL 108 (90 BASE) MCG/ACT inhaler      medroxyPROGESTERone (DEPO-PROVERA) 150 MG/ML injection Inject 1  mL (150 mg) into the muscle every 3 months     No current facility-administered medications for this visit.      No Known Allergies    ROS:  12 point review of systems negative other than symptoms noted below.  Genitourinary: pain with intercourse    OBJECTIVE:     /80 (BP Location: Left arm, Patient Position: Sitting, Cuff Size: Adult Regular)  Wt 166 lb (75.3 kg)  LMP  (LMP Unknown)  BMI 26.79 kg/m2  Body mass index is 26.79 kg/(m^2).    PHYSICAL EXAM:  Constitutional:  Appearance: Well nourished, well developed alert, in no acute distress  Chest:  Respiratory Effort:  Breathing unlabored  Neurologic/Psychiatric:  Mental Status:  Oriented X3   Pelvic Exam:  External Genitalia:     Normal appearance for age, no discharge present, no tenderness present, no inflammatory lesions present, color normal  Vagina:     Normal vaginal vault without central or paravaginal defects, no discharge present, no inflammatory lesions present, no masses present  Bladder:     Nontender to palpation  Urethra:   Urethral Body:  Urethra palpation normal, urethra structural support normal   Urethral Meatus:  No erythema or lesions present  Cervix:     Appearance healthy, no lesions present, nontender to palpation, no bleeding present  Perineum:     Perineum within normal limits, no evidence of trauma, no rashes or skin lesions present  Pubic Hair:     Normal pubic hair distribution for age  Genitalia and Groin:     No rashes present, no lesions present, no areas of discoloration, no masses present       In-Clinic Test Results:  Results for orders placed or performed in visit on 07/06/18 (from the past 24 hour(s))   Wet prep   Result Value Ref Range    Specimen Description Vagina     Wet Prep No Trichomonas seen     Wet Prep No clue cells seen     Wet Prep No yeast seen        ASSESSMENT/PLAN:                                                        ICD-10-CM    1. Vaginal pain R10.2 Medroxyprogesterone inj  1mg   (Depo Provera  J-Code)     INJECTION INTRAMUSCULAR OR SUB-Q     Wet prep       COUNSELING:    Discussed prevention of bacterial vaginosis   Return to Clinic if symptoms worsen.  Return to Clinic for next scheduled Depo Provera injection  Call with questions/concerns    BRITTANY Cherry, ANUM, FAISALNP

## 2018-07-06 NOTE — MR AVS SNAPSHOT
After Visit Summary   7/6/2018    Prachi Ross    MRN: [id number]           Patient Information     Date Of Birth          1995        Visit Information        Provider Department      7/6/2018 4:00 PM Amita Figueredo APRN CNM [facility]        Today's Diagnoses     Vaginal pain    -  1    Encounter for Depo-Provera contraception           Follow-ups after your visit        Follow-up notes from your care team     Return if symptoms worsen or fail to improve.      Who to contact     If you have questions or need follow up information about today's clinic visit or your schedule please contact [facility] directly at [phone].  Normal or non-critical lab and imaging results will be communicated to you by MyChart, letter or phone within 4 business days after the clinic has received the results. If you do not hear from us within 7 days, please contact the clinic through WITOIhart or phone. If you have a critical or abnormal lab result, we will notify you by phone as soon as possible.  Submit refill requests through [employer] or call your pharmacy and they will forward the refill request to us. Please allow 3 business days for your refill to be completed.          Additional Information About Your Visit        MyChart Information     [employer] gives you secure access to your electronic health record. If you see a primary care provider, you can also send messages to your care team and make appointments. If you have questions, please call your primary care clinic.  If you do not have a primary care provider, please call [phone] and they will assist you.        Care EveryWhere ID     This is your Care EveryWhere ID. This could be used by other organizations to access your [place] medical records  [id number]        Your Vitals Were     Last Period BMI (Body Mass Index)                (LMP Unknown) 26.79 kg/m2           Blood Pressure from Last 3 Encounters:    07/06/18 100/80   04/13/18 100/60   01/26/18 94/60    Weight from Last 3 Encounters:   07/06/18 166 lb (75.3 kg)   04/13/18 162 lb (73.5 kg)   01/26/18 158 lb (71.7 kg)              We Performed the Following     INJECTION INTRAMUSCULAR OR SUB-Q     Medroxyprogesterone inj  1mg   (Depo Provera J-Code)     Wet prep        Primary Care Provider Office Phone # Fax #    Tone Bhatia [phone] [address] [facility] [address]        Equal Access to Services     [facility] : Hadii valdemar andrews hadraven Alatorre, waaxda tyra, qaybta kaalmada griselda, pauline cobos . So [facility] [phone].    ATENCIÓN: Si habla español, tiene a naranjo disposición servicios gratuitos de asistencia lingüística. [facility] [phone].    We comply with applicable federal civil rights laws and [place] laws. We do not discriminate on the basis of race, color, national origin, age, disability, sex, sexual orientation, or gender identity.            Thank you!     Thank you for choosing [facility]  for your care. Our goal is always to provide you with excellent care. Hearing back from our patients is one way we can continue to improve our services. Please take a few minutes to complete the written survey that you may receive in the mail after your visit with us. Thank you!             Your Updated Medication List - Protect others around you: Learn how to safely use, store and throw away your medicines at www.disposemymeds.org.          This list is accurate as of 7/6/18  8:41 PM.  Always use your most recent med list.                   Brand Name Dispense Instructions for use Diagnosis    medroxyPROGESTERone 150 MG/ML injection    DEPO-PROVERA    1 mL    Inject 1 mL (150 mg) into the muscle every 3 months    Encounter for surveillance of injectable contraceptive       PROAIR  (90 Base) MCG/ACT Inhaler   Generic drug:  albuterol

## 2018-07-06 NOTE — NURSING NOTE
"Chief Complaint   Patient presents with     Imm/Inj     Due for depo     Vaginal Problem     Pain with intercourse       Initial /80 (BP Location: Left arm, Patient Position: Sitting, Cuff Size: Adult Regular)  Wt 166 lb (75.3 kg)  LMP  (LMP Unknown)  BMI 26.79 kg/m2 Estimated body mass index is 26.79 kg/(m^2) as calculated from the following:    Height as of 3/24/17: 5' 6\" (1.676 m).    Weight as of this encounter: 166 lb (75.3 kg).  BP completed using cuff size: regular    No obstetric history on file.    The following HM Due: NONE    patient has appointment for today.  Abdias Valencia CMA                 "

## 2018-07-28 ENCOUNTER — TRANSFERRED RECORDS (OUTPATIENT)
Dept: HEALTH INFORMATION MANAGEMENT | Facility: CLINIC | Age: 23
End: 2018-07-28

## 2018-08-14 ENCOUNTER — OFFICE VISIT (OUTPATIENT)
Dept: FAMILY MEDICINE | Facility: CLINIC | Age: 23
End: 2018-08-14
Payer: COMMERCIAL

## 2018-08-14 VITALS
HEART RATE: 81 BPM | DIASTOLIC BLOOD PRESSURE: 78 MMHG | OXYGEN SATURATION: 97 % | TEMPERATURE: 98.2 F | RESPIRATION RATE: 16 BRPM | WEIGHT: 167 LBS | BODY MASS INDEX: 26.95 KG/M2 | SYSTOLIC BLOOD PRESSURE: 110 MMHG

## 2018-08-14 DIAGNOSIS — J01.01 ACUTE RECURRENT MAXILLARY SINUSITIS: Primary | ICD-10-CM

## 2018-08-14 PROCEDURE — 99213 OFFICE O/P EST LOW 20 MIN: CPT | Performed by: PHYSICIAN ASSISTANT

## 2018-08-14 RX ORDER — LEVOFLOXACIN 500 MG/1
500 TABLET, FILM COATED ORAL DAILY
Qty: 7 TABLET | Refills: 0 | Status: SHIPPED | OUTPATIENT
Start: 2018-08-14 | End: 2022-02-09

## 2018-08-14 NOTE — PROGRESS NOTES
SUBJECTIVE:   Prachi Ross is a 23 year old female who presents to clinic today for the following health issues:    ENT Symptoms             Symptoms: cc Present Absent Comment   Fever/Chills   x    Fatigue  x     Muscle Aches  x     Eye Irritation   x    Sneezing   x    Nasal Fidel/Drg  x     Sinus Pressure/Pain   x    Loss of smell   x    Dental pain   x    Sore Throat   x    Swollen Glands   x    Ear Pain/Fullness  x  Right side   Cough  x     Wheeze  x     Chest Pain  x     Shortness of breath  x     Rash   x    Other   x      Symptom duration:  about 1 month   Symptom severity:  moderate   Treatments tried:  abx-augmentin and doxycycline, mucinex, flonase, prednisone for three days, tessalon   Contacts:  none     Reviewed and updated as needed this visit by clinical staff  Tobacco  Allergies  Meds  Problems  Med Hx  Surg Hx  Fam Hx  Soc Hx        Reviewed and updated as needed this visit by Provider  Tobacco  Allergies  Meds  Problems  Med Hx  Surg Hx  Fam Hx  Soc Hx        Additional complaints: None    HPI additional notes: Prachi presents today with   Chief Complaint   Patient presents with     URI     Per care everywhere, was seen in [facility] ED on 7/28/18 and given scripts for augmentin and tessalon.     ROS:  C: Negative for fever, chills, recent change in weight  Skin: Negative for worrisome rashes or lesions  ENT/MOUTH:POSITIVE for congestion, runny nose, ear pressure and tinnitus.  Negative for congestion.  Resp: POSITIVE for cough occasionally productive with  SOB and wheezing  CV: Negative for chest pain or peripheral edema  GI: Negative for nausea, abdominal pain, heartburn, or change in bowel habits  MS: Negative for significant arthralgias or myalgias  NEURO: POSITIVE for headache, sinus  P: Negative for changes in mood or affect    Chart Review:  Today's PHQ-2 Score: 0  Today's PHQ-9 Score: No flowsheet data found.    History   Smoking Status     Never Smoker   Smokeless Tobacco      Never Used       Patient Active Problem List   Diagnosis     Encounter for surveillance of injectable contraceptive     History reviewed. No pertinent surgical history.  Problem list, Medication list, Allergies, Medical/Social/Surg hx reviewed in [facility], updated as appropriate.   OBJECTIVE:                                                    /78  Pulse 81  Temp 98.2  F (36.8  C) (Tympanic)  Resp 16  Wt 167 lb (75.8 kg)  SpO2 97%  BMI 26.95 kg/m2  Body mass index is 26.95 kg/(m^2).  GENERAL: healthy, alert, in no acute distress  EYES: Grossly normal to inspection, EOMI, PERRL  HENT: Ear canals normal bilaterally. TM dull gray  retracted with serous effusion bilaterally.  Nasal mucosa mildly edematous with purulent rhinorrhea.  No septal deviation.  Mouth- mucous membranes moist, no lesions or ulcerations. Pharynx pink. and No tonsillary hypertrophy. Uvula midline, purulent post-nasal drainage.  Maxillary and frontal sinuses tender to palpation bilaterally with guarding  NECK:2+ posterior cervical LAD bilateral  RESP: lungs clear to auscultation - no rales, rhonchi or wheezes  CV: regular rate and rhythm, normal S1 S2.   SKIN: no suspicious lesions, no rashes  PSYCH: Alert and oriented times 3;  Able to articulate logical thoughts. Affect is normal.    Diagnostic test results: none      ASSESSMENT/PLAN:                                                          ICD-10-CM    1. Acute recurrent maxillary sinusitis J01.01 OTOLARYNGOLOGY REFERRAL     levofloxacin (LEVAQUIN) 500 MG tablet       Will try one final round of antibiotics and pt will make an appointment with ENT on Friday if not improving.  Discussed importance of continuing saline spray and mucinex.  Use warm compresses and push fluids.    Please see patient instructions for treatment details.    Follow up in 7-10 days if not improving as anticipated.      Shereen Hernandes PA-C  [facility]

## 2018-08-14 NOTE — MR AVS SNAPSHOT
"              After Visit Summary   8/14/2018    Prachi Ross    MRN: [id number]           Patient Information     Date Of Birth          1995        Visit Information        Provider Department      8/14/2018 3:50 PM Shereen Hernandes PA-C [facility]        Today's Diagnoses     Acute recurrent maxillary sinusitis    -  1      Care Instructions    1. Use saline nasal sprays or a neti-pot to wash out nasal passages and clear mucus from the airways.  2. Drink lots of fluids to keep the mucus thin.  OTC medications with active ingredient \"guaifenesin\" (mucinex) help to thin mucus.   3. Apply warm compresses to your sinuses to loosen congestion and promote drainage for 10-15 minutes at a time, 3 or more times a day.  Take hot showers and breath in the steam.  4. Use decongestants to relieve congestion and stop post-nasal drainage:    pseudoephedrine (Sudafed)- 60 mg every 4-6 hours. Avoid using before bedtime as it may keep you awake.  May cause an increase in blood pressure.    Afrin nasal spray- DO NOT use for longer than 3 days.  This will cause rebound congestion and worsening of symptoms.    Fluticasone (Flonase) nasal spray- OTC  medication that helps with chronic congestion.  Must be used daily and may take up to 2 weeks before improvement is noted.    Nasacort nasal spray-  OTC medication that helps with chronic congestion.  Must be used daily and may take up to 2 weeks before improvement is noted.  5. Avoid tobacco smoke.  Avoid any allergy triggers.  OTC Allergy medications (non-drowsy):     Loratadine (Claritin)10mg daily     Fexofenadine (Allegra) 180mg daily                                                                 Cetirizine (Zyrtec)10mg daily  6. Take ibuprofen (600mg every 6 hours with food or water or aleve 440 mg every 12 hours) and tylenol (500mg every 6 hours) for pain.     Sinusitis  Sinusitis is swollen, infected linings of the sinuses. The " sinuses are hollow spaces in the bones of your face and skull that with the nose through small openings. Like the nose, their linings make mucus.   How does it occur?     Sinusitis occurs when the sinus linings become infected. The passageways from the sinuses to the nose are very narrow. Swelling and mucus may block the passageways. This leads to pressure changes in the sinuses that can be painful.     A number of things can cause swelling and sinusitis. Most often it's allergens (things that cause allergies, like pollen and mold) and viruses, such as viruses that cause the common cold. Whether the cause is allergies or a virus, the sinus linings can swell. When swelling causes the sinus passageway to swell shut, bacteria, viruses, and even fungus can be trapped in the sinuses and cause a sinus infection.  This usually does not occur until at least 10-14 days of symptoms.    If your nasal bones have been injured or are deformed, causing partial blockage of the sinus openings, you are more likely to get sinusitis.   How long will the effects last?   Symptoms may get better gradually over 3 to 10 days but may last for days or weeks.   How can I help prevent sinusitis?   Treat your colds/allergies promptly. Use decongestants as soon as you start having symptoms.   Do not smoke and stay away from secondhand smoke.   Drink lots of fluids to keep the mucus thin.    If sinusitis continues to be a problem despite treatment, you might need an exam by an ear, nose, and throat doctor (called an ENT or otolaryngologist). The specialist will check for polyps or a deformed bone that may be blocking your sinuses.             Follow-ups after your visit        Additional Services     OTOLARYNGOLOGY REFERRAL       Your provider has referred you to: N: [facility] [place] [facility] [phone]   [email or web address]  SOPHIA: [facility] [place] [phone]   [email or web address]  [place] [phone]   [email or web address]    Please be aware that coverage of these services is subject to the terms and limitations of your health insurance plan.  Call member services at your health plan with any benefit or coverage questions.      Please bring the following with you to your appointment:    (1) Any X-Rays, CTs or MRIs which have been performed.  Contact the facility where they were done to arrange for  prior to your scheduled appointment.   (2) List of current medications  (3) This referral request   (4) Any documents/labs given to you for this referral                  Who to contact     If you have questions or need follow up information about today's clinic visit or your schedule please contact [facility] directly at [phone].  Normal or non-critical lab and imaging results will be communicated to you by MyChart, letter or phone within 4 business days after the clinic has received the results. If you do not hear from us within 7 days, please contact the clinic through [place]ihart or phone. If you have a critical or abnormal lab result, we will notify you by phone as soon as possible.  Submit refill requests through [email or web address] or call your pharmacy and they will forward the refill request to us. Please allow 3 business days for your refill to be completed.          Additional Information About Your Visit        MyChart Information     [email or web address] gives you secure access to your electronic health record. If you see a primary care provider, you can also send messages to your care team and make appointments. If you have questions, please call your primary care clinic.  If you do not have a primary care provider, please call [phone] and they will assist you.        Care EveryWhere ID     This is your Care EveryWhere ID. This could be used by other organizations to access your [place] medical records  [id number]         Your Vitals Were     Pulse Temperature Respirations Pulse Oximetry BMI (Body Mass Index)       81 98.2  F (36.8  C) (Tympanic) 16 97% 26.95 kg/m2        Blood Pressure from Last 3 Encounters:   08/14/18 110/78   07/06/18 100/80   04/13/18 100/60    Weight from Last 3 Encounters:   08/14/18 167 lb (75.8 kg)   07/06/18 166 lb (75.3 kg)   04/13/18 162 lb (73.5 kg)              We Performed the Following     OTOLARYNGOLOGY REFERRAL          Today's Medication Changes          These changes are accurate as of 8/14/18 11:59 PM.  If you have any questions, ask your nurse or doctor.               Start taking these medicines.        Dose/Directions    levofloxacin 500 MG tablet   Commonly known as:  LEVAQUIN   Used for:  Acute recurrent maxillary sinusitis   Started by:  Shereen Hernandes PA-C        Dose:  500 mg   Take 1 tablet (500 mg) by mouth daily   Quantity:  7 tablet   Refills:  0            Where to get your medicines      These medications were sent to [facility] [address] [place], [place] [address] AT [facility] [address]  [address], [facility] [address]     Phone:  [phone]     levofloxacin 500 MG tablet                Primary Care Provider Office Phone # Fax #    Tone Bhatia [phone] [address] [facility] [address]        Equal Access to Services     [facility]: Hadii valdemar ku hadasho Soomaali, waaxda luqadaha, qaybta kaalmada adeegyada, waxay rakan ashley. So [facility] [phone].    ATENCIÓN: Si habla español, tiene a naranjo disposición servicios gratuitos de asistencia lingüística. Thad al [phone].    We comply with applicable federal civil rights laws and [place] laws. We do not discriminate on the basis of race, color, national origin, age, disability, sex, sexual orientation, or gender identity.            Thank you!     Thank you for choosing [facility]  for  your care. Our goal is always to provide you with excellent care. Hearing back from our patients is one way we can continue to improve our services. Please take a few minutes to complete the written survey that you may receive in the mail after your visit with us. Thank you!             Your Updated Medication List - Protect others around you: Learn how to safely use, store and throw away your medicines at www.disposemymeds.org.          This list is accurate as of 8/14/18 11:59 PM.  Always use your most recent med list.                   Brand Name Dispense Instructions for use Diagnosis    levofloxacin 500 MG tablet    LEVAQUIN    7 tablet    Take 1 tablet (500 mg) by mouth daily    Acute recurrent maxillary sinusitis       medroxyPROGESTERone 150 MG/ML injection    DEPO-PROVERA    1 mL    Inject 1 mL (150 mg) into the muscle every 3 months    Encounter for surveillance of injectable contraceptive       PROAIR  (90 Base) MCG/ACT inhaler   Generic drug:  albuterol

## 2018-12-17 ENCOUNTER — TELEPHONE (OUTPATIENT)
Dept: FAMILY MEDICINE | Facility: CLINIC | Age: 23
End: 2018-12-17

## 2018-12-17 NOTE — TELEPHONE ENCOUNTER
Panel Management Review      Patient has the following on her problem list: None      Composite cancer screening  Chart review shows that this patient is due/due soon for the following None  Summary:    Patient is due/failing the following:   Chlamydia screening and PHYSICAL    Action needed:   Patient needs office visit for physical.    Type of outreach:    Sent letter.    Questions for provider review:    None

## 2018-12-17 NOTE — LETTER
December 17, 2018    Prachi Ross  [address]  [facility] [place] [address]    Dear Margarita Velarde cares about your health and your health plan.  I have reviewed your medical conditions, medication list and lab results, and am making recommendations based on this review to better manage your health.    You are in particular need of attention regarding:  -Wellness (Physical) Visit   -GC Chlamydia Screening    I am recommending that you:     -schedule a WELLNESS (Physical) APPOINTMENT with me.   (If you go elsewhere for Wellness visits then please disregard this reminder.)    -Be tested for Chlamydia      Annual testing is recommended for sexually active women between the ages of 15 and 25.     Chlamydia is the most common bacterial sexually transmitted disease in the [place], according to the Centers for Disease Control (CDC), yet many women considered at risk for the disease do not get the recommended annual screening test.     Chlamydia has no symptoms and left untreated, it can cause infertility and other serious health problems. Chlamydia is easily cured with antibiotics.  We have enclosed a brochure that gives you additional information about Chlamydia.    Testing is now usually done by leaving a urine sample with a lab-only appointment or you can make an office visit if you have other concerns. (If you are not recently or currently sexually active, then please contact us so we can update your medical record.)          Please call us at the [place] location:  [phone] or use [employer] to address the above recommendations.     Thank you for trusting [facility].  We appreciate the opportunity to serve you and look forward to supporting your healthcare in the future.    If you have (or plan to have) any of these tests done at a facility other than a [facility] or a [facility], please have the results sent to the [facility] location noted above.      Best  Regards,    GORAN Henning

## 2020-03-10 ENCOUNTER — HEALTH MAINTENANCE LETTER (OUTPATIENT)
Age: 25
End: 2020-03-10

## 2020-07-12 ENCOUNTER — E-VISIT (OUTPATIENT)
Dept: FAMILY MEDICINE | Facility: CLINIC | Age: 25
End: 2020-07-12
Payer: COMMERCIAL

## 2020-07-12 DIAGNOSIS — J45.21 MILD INTERMITTENT ASTHMA WITH ACUTE EXACERBATION: Primary | ICD-10-CM

## 2020-07-12 PROCEDURE — 99421 OL DIG E/M SVC 5-10 MIN: CPT | Performed by: FAMILY MEDICINE

## 2020-07-13 PROBLEM — J45.21 MILD INTERMITTENT ASTHMA WITH ACUTE EXACERBATION: Status: ACTIVE | Noted: 2020-07-13

## 2020-07-13 RX ORDER — ALBUTEROL SULFATE 90 UG/1
2 AEROSOL, METERED RESPIRATORY (INHALATION) 3 TIMES DAILY PRN
Qty: 36 G | Refills: 1 | Status: SHIPPED | OUTPATIENT
Start: 2020-07-13 | End: 2022-11-01

## 2020-07-13 NOTE — PATIENT INSTRUCTIONS
Thank you for choosing us for your care. I have placed an order for a prescription so that you can start treatment. View your full visit summary for details by clicking on the link below. Your pharmacist will able to address any questions you may have about the medication.     If you're not feeling better within 5-7 days, please schedule an appointment.  You can schedule an appointment right here in [facility], or call [phone]  If the visit is for the same symptoms as your e-visit, we'll refund the cost of your e-visit if seen within seven days.

## 2020-08-17 ENCOUNTER — MYC MEDICAL ADVICE (OUTPATIENT)
Dept: FAMILY MEDICINE | Facility: CLINIC | Age: 25
End: 2020-08-17

## 2020-08-17 ENCOUNTER — E-VISIT (OUTPATIENT)
Dept: FAMILY MEDICINE | Facility: CLINIC | Age: 25
End: 2020-08-17
Payer: COMMERCIAL

## 2020-08-17 DIAGNOSIS — R06.00 DYSPNEA, UNSPECIFIED TYPE: Primary | ICD-10-CM

## 2020-08-17 DIAGNOSIS — J45.21 MILD INTERMITTENT ASTHMA WITH ACUTE EXACERBATION: ICD-10-CM

## 2020-08-17 PROCEDURE — 99421 OL DIG E/M SVC 5-10 MIN: CPT | Performed by: FAMILY MEDICINE

## 2020-08-17 RX ORDER — FLUTICASONE PROPIONATE 110 UG/1
1 AEROSOL, METERED RESPIRATORY (INHALATION) 2 TIMES DAILY
Qty: 12 G | Refills: 1 | Status: SHIPPED | OUTPATIENT
Start: 2020-08-17 | End: 2022-10-04

## 2020-08-18 ENCOUNTER — MYC MEDICAL ADVICE (OUTPATIENT)
Dept: FAMILY MEDICINE | Facility: CLINIC | Age: 25
End: 2020-08-18

## 2020-08-18 NOTE — TELEPHONE ENCOUNTER
Covering providers please advise on pt MC message.  This is related to e-visit and MC from yesterday.

## 2020-08-19 ENCOUNTER — MYC MEDICAL ADVICE (OUTPATIENT)
Dept: FAMILY MEDICINE | Facility: CLINIC | Age: 25
End: 2020-08-19

## 2020-08-19 DIAGNOSIS — R06.00 DYSPNEA, UNSPECIFIED TYPE: ICD-10-CM

## 2020-08-19 PROCEDURE — U0003 INFECTIOUS AGENT DETECTION BY NUCLEIC ACID (DNA OR RNA); SEVERE ACUTE RESPIRATORY SYNDROME CORONAVIRUS 2 (SARS-COV-2) (CORONAVIRUS DISEASE [COVID-19]), AMPLIFIED PROBE TECHNIQUE, MAKING USE OF HIGH THROUGHPUT TECHNOLOGIES AS DESCRIBED BY CMS-2020-01-R: HCPCS | Performed by: FAMILY MEDICINE

## 2020-08-21 LAB
SARS-COV-2 RNA SPEC QL NAA+PROBE: NOT DETECTED
SPECIMEN SOURCE: NORMAL

## 2020-12-27 ENCOUNTER — HEALTH MAINTENANCE LETTER (OUTPATIENT)
Age: 25
End: 2020-12-27

## 2021-04-24 ENCOUNTER — HEALTH MAINTENANCE LETTER (OUTPATIENT)
Age: 26
End: 2021-04-24

## 2021-08-02 ENCOUNTER — OFFICE VISIT (OUTPATIENT)
Dept: FAMILY MEDICINE | Facility: CLINIC | Age: 26
End: 2021-08-02
Payer: COMMERCIAL

## 2021-08-02 VITALS
HEART RATE: 80 BPM | RESPIRATION RATE: 16 BRPM | WEIGHT: 195 LBS | DIASTOLIC BLOOD PRESSURE: 80 MMHG | TEMPERATURE: 99 F | BODY MASS INDEX: 31.47 KG/M2 | SYSTOLIC BLOOD PRESSURE: 137 MMHG | OXYGEN SATURATION: 100 %

## 2021-08-02 DIAGNOSIS — H92.01 RIGHT EAR PAIN: Primary | ICD-10-CM

## 2021-08-02 PROCEDURE — 99214 OFFICE O/P EST MOD 30 MIN: CPT | Performed by: PHYSICIAN ASSISTANT

## 2021-08-02 RX ORDER — FLUTICASONE PROPIONATE 50 MCG
1 SPRAY, SUSPENSION (ML) NASAL DAILY
Qty: 9.9 ML | Refills: 0 | Status: SHIPPED | OUTPATIENT
Start: 2021-08-02 | End: 2022-10-04

## 2021-08-02 RX ORDER — PSEUDOEPHEDRINE HCL 120 MG/1
120 TABLET, FILM COATED, EXTENDED RELEASE ORAL EVERY 12 HOURS
Qty: 30 TABLET | Refills: 0 | Status: SHIPPED | OUTPATIENT
Start: 2021-08-02 | End: 2022-02-09

## 2021-08-02 NOTE — PATIENT INSTRUCTIONS
Giovanni Velarde,    Thank you for allowing [facility] to manage your care.    I sent your prescriptions to your pharmacy.    For your pain, please use Ibuprofen 400mg four times daily with food. Between ibuprofen doses, you may use Tylenol 650mg.     Max acetaminophen (Tylenol) 4,000mg/24 hours  Max ibuprofen 3,200mg/24 hours    I made a referral, they will be calling in approximately 1 week to set up your appointment.  If you do not hear from them, please call the specialty number on your after visit summary.     We are now scheduling all people age 12 and older for COVID-19 vaccines (patients age 12-17 can only  the Pfizer vaccine).     To schedule your appointment, please log in to [employer] using this link to see when and where we have openings.     If you have technical difficulty using [employer], call [phone] for assistance.      More information is on our website: [email or web address].       If you have any questions or concerns, please feel free to call us at [phone]    Sincerely,    Aden Avalos PA-C    Did you know?      You can schedule a video visit for follow-up appointments as well as future appointments for certain conditions.  Please see the below link.     [email or web address]    If you have not already done so,  I encourage you to sign up for [employer] ([email or web address]).  This will allow you to review your results, securely communicate with a provider, and schedule virtual visits as well.      Patient Education     Earache, No Infection (Adult)   Earaches can happen without an infection. They can occur when air and fluid build up behind the eardrum. They may cause a feeling of fullness and discomfort. They may also impair hearing. This is called otitis media with effusion (OME) or serous otitis media. It means there is fluid in the middle ear. It is not the same as acute otitis media, which is often  from an infection.  OME can happen when you have a cold if congestion blocks the passage that drains the middle ear. This passage is called the eustachian tube. OME may also occur with nasal allergies or after a bacterial infection in the middle ear. Other causes are:    Trauma    Improper cleaning of wax from the ear    Bacterial infection of the mastoid bone (mastoiditis)    Tumor    Jaw pain    Changes in pressure, such as from flying or scuba diving    The pain or discomfort may come and go. You may hear clicking or popping sounds when you chew or swallow. You may feel that your balance is off. Or you may hear ringing in the ear.  It often takes from several weeks up to 3 months for the fluid to clear on its own. Oral pain relievers and ear drops help if there is pain. Decongestants and antihistamines sometimes help. Antibiotics don't help since there is no infection. Your healthcare provider may give you a nasal spray to help reduce swelling in the nose and eustachian tube. This can allow the ear to drain.  If your OME doesn't get better after 3 months, surgery may be used to drain the fluid. A small tube may also be put in the eardrum to help with drainage.  Because the middle ear fluid can become infected, watch for signs of an infection. These may develop later. They may include increased ear pain, fever, or drainage from the ear.  Home care  These home-care tips will help you take care of yourself:    You may use over-the-counter medicine as directed by your healthcare provider to control pain, unless medicine was prescribed. If you have chronic liver or kidney disease or ever had a stomach ulcer or GI bleeding, talk with your healthcare provider before using any medicines.    Aspirin should never be used in anyone younger than age 18 who has a fever. It may cause severe liver damage.    Ask your healthcare provider if you may use over-the-counter decongestants such as phenylephrine or pseudoephedrine. Keep  in mind they are not always helpful.    Talk with your healthcare provider about using nasal spray decongestants. Don't use them for more than 3 days, or as directed by your healthcare provider. Longer use can make congestion worse. Prescription nasal sprays from your healthcare provider don't often have such restrictions.    Antihistamines may help if you are also having allergy symptoms.    You may use medicines such as guaifenesin to thin mucus and help with drainage.  Follow-up care  Follow up with your healthcare provider or as advised if you are not feeling better after 3 days.  When to seek medical advice  Call your healthcare provider right away if any of these occur:    Ear pain that gets worse or that does not start to get better     Fever of 100.4 F (38 C) or higher, or as directed by your healthcare provider    Fluid or blood draining from the ear    Headache or sinus pain    Stiff neck    Unusual drowsiness or confusion  [employer] last reviewed this educational content on 12/1/2019 2000-2021 [employer]. All rights reserved. This information is not intended as a substitute for professional medical care. Always follow your healthcare professional's instructions.

## 2021-08-02 NOTE — PROGRESS NOTES
Assessment & Plan   Problem List Items Addressed This Visit     None      Visit Diagnoses     Right ear pain    -  Primary    Relevant Medications    pseudoePHEDrine (SUDAFED) 120 MG 12 hr tablet    fluticasone (FLONASE) 50 MCG/ACT nasal spray    Other Relevant Orders    Otolaryngology Referral         Impression is likely eustachian tube dysfunction. No evidence of AOM, OE, mastoiditis, perforation or other issue. Will treat with decongestants, otc meds and ENT if not resolving in the next 1-2 months. Discussed COVID vaccination.    Complete history and physical exam as below. AF with normal VS.    DDx and Dx discussed with and explained to the pt to their satisfaction.  All questions were answered at this time. Pt expressed understanding of and agreement with this dx, tx, and plan. No further workup warranted and standard medication warnings given. I have given the patient a list of pertinent indications for re-evaluation. Will go to the Emergency Department if symptoms worsen or new concerning symptoms arise. Patient left in no apparent distress.     33 minutes spent on the date of the encounter doing chart review, history and exam, documentation and further activities per the note     See Patient Instructions    Return in about 2 weeks (around 8/16/2021) for a recheck as needed.    GORAN Pike  [facility] PAULINA Velarde is a 25 year old who presents for the following health issues    HPI     Acute Illness    Onset/Duration: 3 weeks  Symptoms:  Fever: no  Chills/Sweats: no  Headache (location?): no  Sinus Pressure: no  Conjunctivitis:  no  Ear Pain: YES: right; fullness/ ringing. Discomfort. Normal hearing. Denies: drainage, 3/10 pain.   Rhinorrhea: no  Congestion: no  Sore Throat: no  Cough: no  Wheeze: no  Decreased Appetite: no  Nausea: no  Vomiting: no  Diarrhea: no  Dysuria/Freq.: no  Hematuria: no  Fatigue/Achiness: no  Sick Exposure: no  Therapies tried and  outcome: None    On 06/23/21 had multiple fillings on right upper jaw    Ally DeShong CMA    Review of Systems   Constitutional, HEENT, cardiovascular, pulmonary, gi and gu systems are negative, except as otherwise noted.      Objective    /80   Pulse 80   Temp 99  F (37.2  C) (Tympanic)   Resp 16   Wt 88.5 kg (195 lb)   SpO2 100%   Breastfeeding No   BMI 31.47 kg/m    Body mass index is 31.47 kg/m .  Physical Exam  Vitals and nursing note reviewed.   Constitutional:       General: She is not in acute distress.     Appearance: Normal appearance. She is not diaphoretic.   HENT:      Head: Normocephalic and atraumatic.      Right Ear: Ear canal and external ear normal.      Left Ear: Tympanic membrane, ear canal and external ear normal.      Ears:      Comments: TM has scarring but is otherwise normal. External ears and mastoids non-tender.     Nose: Nose normal.   Eyes:      Conjunctiva/sclera: Conjunctivae normal.   Pulmonary:      Effort: Pulmonary effort is normal. No respiratory distress.   Musculoskeletal:      Cervical back: Normal range of motion and neck supple. No rigidity.   Lymphadenopathy:      Cervical: No cervical adenopathy.   Skin:     General: Skin is dry.      Coloration: Skin is not jaundiced or pale.   Neurological:      General: No focal deficit present.      Mental Status: She is alert. Mental status is at baseline.   Psychiatric:         Mood and Affect: Mood normal.         Behavior: Behavior normal.

## 2021-09-23 ENCOUNTER — TRANSFERRED RECORDS (OUTPATIENT)
Dept: HEALTH INFORMATION MANAGEMENT | Facility: CLINIC | Age: 26
End: 2021-09-23

## 2021-10-04 ENCOUNTER — HEALTH MAINTENANCE LETTER (OUTPATIENT)
Age: 26
End: 2021-10-04

## 2021-10-05 ENCOUNTER — TELEPHONE (OUTPATIENT)
Dept: OTOLARYNGOLOGY | Facility: CLINIC | Age: 26
End: 2021-10-05

## 2021-10-05 NOTE — TELEPHONE ENCOUNTER
Called and left message for audiogram on Friday 10/8/2021 at 8:45. Dr Copeland would like her to have an audio before her appointment at 9:30.     Donita Castro MA, [facility] ......10/5/2021...9:46 AM

## 2021-10-05 NOTE — PROGRESS NOTES
I am seeing this patient in consultation for right ear pain at the request of the provider Dr. Vijay Avalos.    Chief Complaint - right ear pain    History of Present Illness - Prachi Ross is a 26 year old female who presents with the new onset of right ear pain. This started with dental work 6/2021 - had fillings posterior right maxillary molars. Things got better, but then worsened 2 weeks ago. The patient describes pain in the ear, but also right cheek. She got better and doesn't have pain the last week. She took antibiotics and steroids. She had tubes as a child and a bilateral AOM with tympanic membrane rupture age 17. She does feel intermittent hearing changes. Dentist x-rays at follow-up were normal.     Past Medical History -   Patient Active Problem List   Diagnosis     Encounter for surveillance of injectable contraceptive     Mild intermittent asthma with acute exacerbation       Current Medications -   Current Outpatient Medications:      fluticasone (FLONASE) 50 MCG/ACT nasal spray, Spray 1 spray into both nostrils daily, Disp: 9.9 mL, Rfl: 0     fluticasone (FLOVENT HFA) 110 MCG/ACT inhaler, Inhale 1 puff into the lungs 2 times daily, Disp: 12 g, Rfl: 1     levofloxacin (LEVAQUIN) 500 MG tablet, Take 1 tablet (500 mg) by mouth daily, Disp: 7 tablet, Rfl: 0     medroxyPROGESTERone (DEPO-PROVERA) 150 MG/ML injection, Inject 1 mL (150 mg) into the muscle every 3 months, Disp: 1 mL, Rfl: 3     PROAIR  (90 Base) MCG/ACT inhaler, Inhale 2 puffs into the lungs 3 times daily as needed for shortness of breath / dyspnea or wheezing, Disp: 36 g, Rfl: 1     pseudoePHEDrine (SUDAFED) 120 MG 12 hr tablet, Take 1 tablet (120 mg) by mouth every 12 hours, Disp: 30 tablet, Rfl: 0    Allergies - No Known Allergies    Social History -   Social History     Socioeconomic History     Marital status: Single     Spouse name: None     Number of children: 0     Years of education: None     Highest education  level: None   Occupational History     Occupation: Factory work   Tobacco Use     Smoking status: Never Smoker     Smokeless tobacco: Never Used   Substance and Sexual Activity     Alcohol use: Yes     Alcohol/week: 0.0 standard drinks     Drug use: No     Sexual activity: Yes     Partners: Male     Birth control/protection: Injection     Comment: [email or web address]   Other Topics Concern     Parent/sibling w/ CABG, MI or angioplasty before 65F 55M? Not Asked   Social History Narrative     None     Social Determinants of Health     Financial Resource Strain:      Difficulty of Paying Living Expenses:    Food Insecurity:      Worried About Running Out of Food in the Last Year:      Ran Out of Food in the Last Year:    Transportation Needs:      Lack of Transportation (Medical):      Lack of Transportation (Non-Medical):    Physical Activity:      Days of Exercise per Week:      Minutes of Exercise per Session:    Stress:      Feeling of Stress :    Social Connections:      Frequency of Communication with Friends and Family:      Frequency of Social Gatherings with Friends and Family:      Attends Congregation Services:      Active Member of Clubs or Organizations:      Attends Club or Organization Meetings:      Marital Status:    Intimate Partner Violence:      Fear of Current or Ex-Partner:      Emotionally Abused:      Physically Abused:      Sexually Abused:        Family History -   Family History   Problem Relation Age of Onset     Hypertension Mother      Coronary Artery Disease Maternal Grandmother      Diabetes No family hx of      Hyperlipidemia No family hx of      Cerebrovascular Disease No family hx of      Breast Cancer No family hx of      Colon Cancer No family hx of      Prostate Cancer No family hx of      Other Cancer No family hx of      Depression No family hx of      Anxiety Disorder No family hx of      Mental Illness No family hx of      Substance Abuse No family hx of      Anesthesia Reaction No family hx of   "    Asthma No family hx of      Osteoporosis No family hx of      Genetic Disorder No family hx of      Thyroid Disease No family hx of      Obesity No family hx of      Unknown/Adopted No family hx of        Review of Systems - As per HPI and PMHx, otherwise 7 system review is negative.    Physical Exam  /85   Pulse 77   Ht 1.676 m (5' 6\")   Wt 88.5 kg (195 lb)   SpO2 98%   BMI 31.47 kg/m    General - The patient is in no distress.  Alert and oriented to person and place, answers questions and cooperates with examination appropriately.   Neurologic - CN II-XII are grossly intact, no focal neurologic deficits. HB 1 out of 6 bilaterally.  Voice and Breathing - The patient was breathing comfortably without the use of accessory muscles. There was no wheezing, stridor, or stertor.  The patients voice was clear and strong.  Eyes - Extraocular movements intact. Sclera were not icteric or injected, conjunctiva were pink and moist.  Ears - clean canals. No erythema. The tympanic membranes are normal in appearance, bony landmarks are intact.  No retraction, perforation, or masses. No fluid or purulence was seen in the external canal or the middle ear. No evidence of infection of the middle ear or external canal, cerumen was normal in appearance.  Mouth - Dentition in fair condition, no masses, ulcerations, or erosions noted on examination of mucosa. The tongue is mobile and midline, and the uvula is midline on elevation. No TMJ tenderness.   Throat - The walls of the oropharynx were smooth, symmetric, and had no lesions or ulcerations. The uvula was midline on elevation.    Neck - Palpation of the occipital, submental, submandibular, internal jugular chain, and supraclavicular nodes did not demonstrate any abnormal lymph nodes or masses. Palpation of the thyroid was soft and smooth, with no nodules or goiter appreciated.  The trachea was mobile and midline.        A/P - Prachi Ross is a 26 year old female who " presents with right otalgia.  Based on today's history and physical exam, I can find no evidence of middle ear pathology or eustachian tube dysfunction.  At this point my primary diagnosis is of temporomandibular syndrome, odontogenic origin, or migraine.  I have discussed the etiology of TMJ, tooth disease and migraines can mimic symptoms of ear disease and headaches. I have given the patient an instructional sheet of things to be tried at home. She will see an endodontist. If this all fails we can consider MRI face/brain and referral to neurology.     Caden Copeland MD  Otolaryngology  [facility]

## 2021-10-08 ENCOUNTER — OFFICE VISIT (OUTPATIENT)
Dept: OTOLARYNGOLOGY | Facility: CLINIC | Age: 26
End: 2021-10-08
Payer: COMMERCIAL

## 2021-10-08 ENCOUNTER — OFFICE VISIT (OUTPATIENT)
Dept: AUDIOLOGY | Facility: CLINIC | Age: 26
End: 2021-10-08
Payer: COMMERCIAL

## 2021-10-08 VITALS
OXYGEN SATURATION: 98 % | SYSTOLIC BLOOD PRESSURE: 117 MMHG | DIASTOLIC BLOOD PRESSURE: 85 MMHG | HEART RATE: 77 BPM | HEIGHT: 66 IN | BODY MASS INDEX: 31.34 KG/M2 | WEIGHT: 195 LBS

## 2021-10-08 DIAGNOSIS — H92.01 OTALGIA, RIGHT: ICD-10-CM

## 2021-10-08 DIAGNOSIS — H90.42 SENSORINEURAL HEARING LOSS (SNHL) OF LEFT EAR WITH UNRESTRICTED HEARING OF RIGHT EAR: Primary | ICD-10-CM

## 2021-10-08 DIAGNOSIS — H92.01 REFERRED OTALGIA OF RIGHT EAR: Primary | ICD-10-CM

## 2021-10-08 PROCEDURE — 99207 PR NO CHARGE LOS: CPT | Performed by: AUDIOLOGIST

## 2021-10-08 PROCEDURE — 92557 COMPREHENSIVE HEARING TEST: CPT | Performed by: AUDIOLOGIST

## 2021-10-08 PROCEDURE — 99243 OFF/OP CNSLTJ NEW/EST LOW 30: CPT | Performed by: OTOLARYNGOLOGY

## 2021-10-08 PROCEDURE — 92550 TYMPANOMETRY & REFLEX THRESH: CPT | Performed by: AUDIOLOGIST

## 2021-10-08 ASSESSMENT — MIFFLIN-ST. JEOR: SCORE: 1641.26

## 2021-10-08 NOTE — PATIENT INSTRUCTIONS
General Scheduling Information  To schedule your CT/MRI scan, please contact [place] [facility] at [phone]   [address] [place] [address]    To schedule your Surgery, please contact our Specialty Schedulers at [phone]    ENT Clinic Locations Clinic Hours Telephone Number     [facility] Mark  [address] [place] [address]   Tuesday:       8:00am -- 4:00pm    Wednesday:  8:00am - 4:00pm   To schedule an appointment with   Dr. Copeland,   please contact our   Specialty Scheduling Department at:     [phone]       [facility]  [address] [place] [address]   Friday:          8:00am - 4:00pm         Urgent Care Locations Clinic Hours Telephone Numbers     Marg[facility]ita Barrow  [address] [place] [address]     Monday-Friday:     11:00pm - 9:00pm    Saturday-Sunday:  9:00am - 5:00pm   [phone]     Marg[facility]ita [facility]  [address] [place] [address]     Monday-Friday:      5:00pm - 9:00pm     Saturday-Sunday:  9:00am - 5:00pm   [phone]

## 2021-10-08 NOTE — PROGRESS NOTES
AUDIOLOGY REPORT:    Patient was referred from ENT by Dr. Copeland for audiology evaluation. The patient reports that she had dental work done in June of this year and had pain for longer than she would have expected. She then started having pain and pressure in the right ear. This improved but it came back around three weeks ago, with tooth and ear pain and a sinus infection. The patient reports that she was treated with antibiotics and her ear improved again. The patient reports occasional bilateral tinnitus. She reports a history of PE tubes when she was younger, as well as tympanic membrane perforations from an ear infection when she was a senior in high school. 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 The patient reports that she has not had any changes in hearing, though there is a sensation of pressure with the ear pain.    Testing:    Otoscopy:   Otoscopic exam indicates ears are clear of cerumen bilaterally     Tympanograms:    RIGHT: normal eardrum mobility     LEFT:   normal eardrum mobility    Reflexes (reported by stimulus ear):  RIGHT: Ipsilateral is present at elevated levels   RIGHT: Contralateral is present at elevated levels   LEFT:   Ipsilateral is present at normal levels  LEFT:   Contralateral is present at elevated levels     Thresholds:   Pure Tone Thresholds assessed using conventional audiometry with good reliability from 250-8000 Hz bilaterally using insert earphones and circumaural headphones     RIGHT:  normal hearing sensitivity at all tested frequencies     LEFT:    essentially normal hearing sensitivity with mild sensorineural hearing loss at 2000 Hz    Speech Reception Threshold:    RIGHT: 15 dB HL    LEFT:   15 dB HL  Results are in agreement with pure tone average.     Word Recognition Score:     RIGHT: 100% at 55 dB HL using NU-6 recorded word list.    LEFT:   100% at 60 dB HL using NU-6 recorded word list.    Discussed  results with the patient.     Patient was returned to ENT for follow up.     Santana Lora, CCC-A  Licensed Audiologist #[id number]  10/8/2021

## 2021-10-08 NOTE — LETTER
10/8/2021         RE: Prachi Ross  [address] [place] [facility] [address]        Dear Colleague,    Thank you for referring your patient, Prachi Ross, to the [facility]. Please see a copy of my visit note below.    I am seeing this patient in consultation for right ear pain at the request of the provider Dr. Vijay Avalos.    Chief Complaint - right ear pain    History of Present Illness - Prachi Ross is a 26 year old female who presents with the new onset of right ear pain. This started with dental work 6/2021 - had fillings posterior right maxillary molars. Things got better, but then worsened 2 weeks ago. The patient describes pain in the ear, but also right cheek. She got better and doesn't have pain the last week. She took antibiotics and steroids. She had tubes as a child and a bilateral AOM with tympanic membrane rupture age 17. She does feel intermittent hearing changes. Dentist x-rays at follow-up were normal.     Past Medical History -   Patient Active Problem List   Diagnosis     Encounter for surveillance of injectable contraceptive     Mild intermittent asthma with acute exacerbation       Current Medications -   Current Outpatient Medications:      fluticasone (FLONASE) 50 MCG/ACT nasal spray, Spray 1 spray into both nostrils daily, Disp: 9.9 mL, Rfl: 0     fluticasone (FLOVENT HFA) 110 MCG/ACT inhaler, Inhale 1 puff into the lungs 2 times daily, Disp: 12 g, Rfl: 1     levofloxacin (LEVAQUIN) 500 MG tablet, Take 1 tablet (500 mg) by mouth daily, Disp: 7 tablet, Rfl: 0     medroxyPROGESTERone (DEPO-PROVERA) 150 MG/ML injection, Inject 1 mL (150 mg) into the muscle every 3 months, Disp: 1 mL, Rfl: 3     PROAIR  (90 Base) MCG/ACT inhaler, Inhale 2 puffs into the lungs 3 times daily as needed for shortness of breath / dyspnea or wheezing, Disp: 36 g, Rfl: 1     pseudoePHEDrine (SUDAFED) 120 MG 12 hr tablet, Take 1 tablet (120 mg) by mouth every 12  hours, Disp: 30 tablet, Rfl: 0    Allergies - No Known Allergies    Social History -   Social History     Socioeconomic History     Marital status: Single     Spouse name: None     Number of children: 0     Years of education: None     Highest education level: None   Occupational History     Occupation: Factory work   Tobacco Use     Smoking status: Never Smoker     Smokeless tobacco: Never Used   Substance and Sexual Activity     Alcohol use: Yes     Alcohol/week: 0.0 standard drinks     Drug use: No     Sexual activity: Yes     Partners: Male     Birth control/protection: Injection     Comment: depo   Other Topics Concern     Parent/sibling w/ CABG, MI or angioplasty before 65F 55M? Not Asked   Social History Narrative     None     Social Determinants of Health     Financial Resource Strain:      Difficulty of Paying Living Expenses:    Food Insecurity:      Worried About Running Out of Food in the Last Year:      Ran Out of Food in the Last Year:    Transportation Needs:      Lack of Transportation (Medical):      Lack of Transportation (Non-Medical):    Physical Activity:      Days of Exercise per Week:      Minutes of Exercise per Session:    Stress:      Feeling of Stress :    Social Connections:      Frequency of Communication with Friends and Family:      Frequency of Social Gatherings with Friends and Family:      Attends [employer] Services:      Active Member of Clubs or Organizations:      Attends Club or Organization Meetings:      Marital Status:    Intimate Partner Violence:      Fear of Current or Ex-Partner:      Emotionally Abused:      Physically Abused:      Sexually Abused:        Family History -   Family History   Problem Relation Age of Onset     Hypertension Mother      Coronary Artery Disease Maternal Grandmother      Diabetes No family hx of      Hyperlipidemia No family hx of      Cerebrovascular Disease No family hx of      Breast Cancer No family hx of      Colon Cancer No family hx of   "    Prostate Cancer No family hx of      Other Cancer No family hx of      Depression No family hx of      Anxiety Disorder No family hx of      Mental Illness No family hx of      Substance Abuse No family hx of      Anesthesia Reaction No family hx of      Asthma No family hx of      Osteoporosis No family hx of      Genetic Disorder No family hx of      Thyroid Disease No family hx of      Obesity No family hx of      Unknown/Adopted No family hx of        Review of Systems - As per HPI and PMHx, otherwise 7 system review is negative.    Physical Exam  /85   Pulse 77   Ht 1.676 m (5' 6\")   Wt 88.5 kg (195 lb)   SpO2 98%   BMI 31.47 kg/m    General - The patient is in no distress.  Alert and oriented to person and place, answers questions and cooperates with examination appropriately.   Neurologic - CN II-XII are grossly intact, no focal neurologic deficits. HB 1 out of 6 bilaterally.  Voice and Breathing - The patient was breathing comfortably without the use of accessory muscles. There was no wheezing, stridor, or stertor.  The patients voice was clear and strong.  Eyes - Extraocular movements intact. Sclera were not icteric or injected, conjunctiva were pink and moist.  Ears - clean canals. No erythema. The tympanic membranes are normal in appearance, bony landmarks are intact.  No retraction, perforation, or masses. No fluid or purulence was seen in the external canal or the middle ear. No evidence of infection of the middle ear or external canal, cerumen was normal in appearance.  Mouth - Dentition in fair condition, no masses, ulcerations, or erosions noted on examination of mucosa. The tongue is mobile and midline, and the uvula is midline on elevation. No TMJ tenderness.   Throat - The walls of the oropharynx were smooth, symmetric, and had no lesions or ulcerations. The uvula was midline on elevation.    Neck - Palpation of the occipital, submental, submandibular, internal jugular chain, and " supraclavicular nodes did not demonstrate any abnormal lymph nodes or masses. Palpation of the thyroid was soft and smooth, with no nodules or goiter appreciated.  The trachea was mobile and midline.        A/P - Prachi Ross is a 26 year old female who presents with right otalgia.  Based on today's history and physical exam, I can find no evidence of middle ear pathology or eustachian tube dysfunction.  At this point my primary diagnosis is of temporomandibular syndrome, odontogenic origin, or migraine.  I have discussed the etiology of TMJ, tooth disease and migraines can mimic symptoms of ear disease and headaches. I have given the patient an instructional sheet of things to be tried at home. She will see an endodontist. If this all fails we can consider MRI face/brain and referral to neurology.     Caden Copeland MD  Otolaryngology  [facility]        Again, thank you for allowing me to participate in the care of your patient.        Sincerely,        Caden Copeland MD

## 2021-11-29 ENCOUNTER — IMMUNIZATION (OUTPATIENT)
Dept: NURSING | Facility: CLINIC | Age: 26
End: 2021-11-29
Payer: COMMERCIAL

## 2021-11-29 DIAGNOSIS — Z23 HIGH PRIORITY FOR 2019-NCOV VACCINE: Primary | ICD-10-CM

## 2021-11-29 PROCEDURE — 0011A COVID-19,PF,MODERNA (18+ YRS PRIMARY SERIES .5ML): CPT

## 2021-11-29 PROCEDURE — 91301 COVID-19,PF,MODERNA (18+ YRS PRIMARY SERIES .5ML): CPT

## 2021-12-15 ENCOUNTER — MYC MEDICAL ADVICE (OUTPATIENT)
Dept: FAMILY MEDICINE | Facility: CLINIC | Age: 26
End: 2021-12-15
Payer: COMMERCIAL

## 2021-12-15 NOTE — TELEPHONE ENCOUNTER
Network [employer] message sent to patient. Ok to get second dose if she meets criteria to end isolation.

## 2021-12-20 ENCOUNTER — ALLIED HEALTH/NURSE VISIT (OUTPATIENT)
Dept: NURSING | Facility: CLINIC | Age: 26
End: 2021-12-20
Payer: COMMERCIAL

## 2021-12-20 DIAGNOSIS — Z23 HIGH PRIORITY FOR 2019-NCOV VACCINE: Primary | ICD-10-CM

## 2021-12-20 PROCEDURE — 91301 COVID-19,PF,MODERNA (18+ YRS PRIMARY SERIES .5ML): CPT

## 2021-12-20 PROCEDURE — 0012A COVID-19,PF,MODERNA (18+ YRS PRIMARY SERIES .5ML): CPT

## 2021-12-20 PROCEDURE — 99207 PR NO CHARGE LOS: CPT

## 2022-02-09 ENCOUNTER — OFFICE VISIT (OUTPATIENT)
Dept: FAMILY MEDICINE | Facility: CLINIC | Age: 27
End: 2022-02-09
Payer: COMMERCIAL

## 2022-02-09 VITALS
TEMPERATURE: 98.8 F | OXYGEN SATURATION: 100 % | SYSTOLIC BLOOD PRESSURE: 110 MMHG | BODY MASS INDEX: 32.3 KG/M2 | RESPIRATION RATE: 16 BRPM | HEIGHT: 66 IN | DIASTOLIC BLOOD PRESSURE: 66 MMHG | WEIGHT: 201 LBS | HEART RATE: 106 BPM

## 2022-02-09 DIAGNOSIS — S83.412A SPRAIN OF MEDIAL COLLATERAL LIGAMENT OF LEFT KNEE, INITIAL ENCOUNTER: Primary | ICD-10-CM

## 2022-02-09 DIAGNOSIS — J45.20 MILD INTERMITTENT ASTHMA WITHOUT COMPLICATION: ICD-10-CM

## 2022-02-09 PROCEDURE — 99213 OFFICE O/P EST LOW 20 MIN: CPT | Performed by: STUDENT IN AN ORGANIZED HEALTH CARE EDUCATION/TRAINING PROGRAM

## 2022-02-09 ASSESSMENT — MIFFLIN-ST. JEOR: SCORE: 1668.48

## 2022-02-09 ASSESSMENT — PAIN SCALES - GENERAL: PAINLEVEL: MODERATE PAIN (5)

## 2022-02-09 NOTE — PROGRESS NOTES
"  Assessment & Plan     Sprain of medial collateral ligament of left knee, initial encounter  Exam consistent with left MCL sprain.  Continue to ice and use ibuprofen as needed.  We did discuss home exercises to progress with as discomfort improves.  If things not getting better she would let me know and I will have her follow-up with physical therapy.    Mild intermittent asthma without acute exacerbation  Patient has not been using albuterol consistently.  She reports she does have this at home.  It does look like she is prescribed Flovent previously but tells me she has never used it.  ACT done today and suboptimal.  We discussed restarting this and she would like to hold off for now.  She will use albuterol as needed and if things not controlled she will plan to start the Flovent.  She should follow-up for regular physical and further discussion of her asthma.      BMI:   Estimated body mass index is 32.44 kg/m  as calculated from the following:    Height as of this encounter: 1.676 m (5' 6\").    Weight as of this encounter: 91.2 kg (201 lb).     Return for Routine preventive.    Fadi Mason MD  [facility]    Nevin Velarde is a 26 year old who presents for the following health issues   HPI     Chief Complaint   Patient presents with     Knee Pain     left knee pain since 1/29/22 - NKI     Pain start 1.5 weeks ago when she turned quickly when talking to a coworker. She did not slip or fall. No direct impact. This was sharp for a second and has bothered her since. She has not had any other major issues with her left knee. Did have tendinitis when she was a child. She has tried ibuprofen which did help.  Continues to have discomfort worse with walking.  Minimal when she is not active.    Does note history of asthma but does have a role at home although she has not used it often.  This has made her feel jittery in the past so she avoids it.  Does note some increase in her asthma " "symptoms on ACT today.  Looks like she was provided Flovent in the past but has not used this.    Review of Systems   Constitutional, HEENT, cardiovascular, pulmonary, gi and gu systems are negative, except as otherwise noted.      Objective    /66   Pulse 106   Temp 98.8  F (37.1  C) (Temporal)   Resp 16   Ht 1.676 m (5' 6\")   Wt 91.2 kg (201 lb)   SpO2 100%   BMI 32.44 kg/m    Body mass index is 32.44 kg/m .  Physical Exam   GENERAL: healthy, alert and no distress  EYES: Eyes grossly normal to inspection, PERRL and conjunctivae and sclerae normal  HENT: Hearing grossly intact  RESP: lungs clear to auscultation - no rales, rhonchi or wheezes  CV: regular rate and rhythm, normal S1 S2, no S3 or S4, no murmur, click or rub, no peripheral edema and peripheral pulses strong  MS: no gross musculoskeletal defects noted, no edema, no crepitus at left kneecap.  Does have some tenderness with valgus strain medial MCL.  Some tenderness to palpation along her MCL.  No specific joint line tenderness or swelling noted.  No anterior distal knee Tenderness.  No popliteal tenderness.  Negative Jus's, LCL PCL, ACL and MCL do appear intact and symmetric.  SKIN: no suspicious lesions or rashes  NEURO: Normal strength and tone, mentation intact and speech normal  PSYCH: mentation appears normal, affect normal/bright      "

## 2022-05-15 ENCOUNTER — HEALTH MAINTENANCE LETTER (OUTPATIENT)
Age: 27
End: 2022-05-15

## 2022-05-27 ENCOUNTER — MYC MEDICAL ADVICE (OUTPATIENT)
Dept: FAMILY MEDICINE | Facility: CLINIC | Age: 27
End: 2022-05-27
Payer: COMMERCIAL

## 2022-09-11 ENCOUNTER — HEALTH MAINTENANCE LETTER (OUTPATIENT)
Age: 27
End: 2022-09-11

## 2022-09-29 NOTE — PROGRESS NOTES
Assessment & Plan     Constipation, unspecified constipation type  Improved  Continue fiber  ;See patient instructions below for more plan.      External hemorrhoids  Improved  Consider anusol next if needed or colorectal referral but already improved  Discussed prevention below      Missed period  Would monitor and get tsh, us in future if continue to occur. Could be normal/stress related.       Billin min spent on patient today including chart review, history, exam, and explaining treatment plan and follow-up.       Patient Instructions   Ok to use miralax as needed  Continue fiber supplement        Amita Gillis PA-C  [facility]   Prachi is a 27 year old  presenting for the following health issues:  Constipation, Rectal Problem, and Menstrual Problem        New patient to me:    History of Present Illness       Reason for visit:  Constipation/hemorrhoids  Symptom onset:  3-4 weeks ago  Symptom intensity:  Moderate  Symptom progression:  Improving  Had these symptoms before:  Yes  Has tried/received treatment for these symptoms:  No    She eats 0-1 servings of fruits and vegetables daily.She consumes 2 sweetened beverage(s) daily.She exercises with enough effort to increase her heart rate 20 to 29 minutes per day.  She exercises with enough effort to increase her heart rate 3 or less days per week.   She is taking medications regularly.      Constipation started august. This has become better with fiber gummies.   Started fiber gummies.   Using hemorrhoid wipes. Is better now.   No pain anymore. No blood anymore. Had when hemorrhoid was flared.     Concern - missed period in August   Onset: 2022  Description: missed period   Intensity: none  Progression of Symptoms:  none  Accompanying Signs & Symptoms: none  Previous history of similar problem: missed period in  after getting covid vaccine  Precipitating factors:        Worsened by: none  Alleviating  factors:        Improved by: none  Therapies tried and outcome: None    Had one late period after covid, then again in august. No other concerns or issues there.   On birth control? Not currently . Off depot for years.   Last pap? Is due. Declined today, scheduled before left.         Review of Systems   Constitutional, HEENT, cardiovascular, pulmonary, GI, , musculoskeletal, neuro, skin, endocrine and psych systems are negative, except as otherwise noted.      Objective    /85   Pulse 98   Temp 98.3  F (36.8  C) (Tympanic)   Resp 20   Wt 97.5 kg (215 lb)   LMP 08/28/2022 (Exact Date)   SpO2 98%   BMI 34.70 kg/m    Body mass index is 34.7 kg/m .  Physical Exam   GENERAL: alert, no distress and obese  RESP: lungs clear to auscultation - no rales, rhonchi or wheezes  CV: regular rate and rhythm, normal S1 S2, no S3 or S4, no murmur, click or rub, no peripheral edema and peripheral pulses strong  MS: no gross musculoskeletal defects noted, no edema  PSYCH: mentation appears normal, affect normal/bright

## 2022-10-04 ENCOUNTER — OFFICE VISIT (OUTPATIENT)
Dept: FAMILY MEDICINE | Facility: CLINIC | Age: 27
End: 2022-10-04
Payer: COMMERCIAL

## 2022-10-04 VITALS
OXYGEN SATURATION: 98 % | WEIGHT: 215 LBS | DIASTOLIC BLOOD PRESSURE: 85 MMHG | HEART RATE: 98 BPM | BODY MASS INDEX: 34.7 KG/M2 | RESPIRATION RATE: 20 BRPM | TEMPERATURE: 98.3 F | SYSTOLIC BLOOD PRESSURE: 123 MMHG

## 2022-10-04 DIAGNOSIS — K64.4 EXTERNAL HEMORRHOIDS: ICD-10-CM

## 2022-10-04 DIAGNOSIS — K59.00 CONSTIPATION, UNSPECIFIED CONSTIPATION TYPE: Primary | ICD-10-CM

## 2022-10-04 DIAGNOSIS — N92.6 MISSED PERIOD: ICD-10-CM

## 2022-10-04 PROCEDURE — 99214 OFFICE O/P EST MOD 30 MIN: CPT | Performed by: PHYSICIAN ASSISTANT

## 2022-10-04 ASSESSMENT — ASTHMA QUESTIONNAIRES
QUESTION_3 LAST FOUR WEEKS HOW OFTEN DID YOUR ASTHMA SYMPTOMS (WHEEZING, COUGHING, SHORTNESS OF BREATH, CHEST TIGHTNESS OR PAIN) WAKE YOU UP AT NIGHT OR EARLIER THAN USUAL IN THE MORNING: NOT AT ALL
QUESTION_4 LAST FOUR WEEKS HOW OFTEN HAVE YOU USED YOUR RESCUE INHALER OR NEBULIZER MEDICATION (SUCH AS ALBUTEROL): NOT AT ALL
ACT_TOTALSCORE: 22
QUESTION_1 LAST FOUR WEEKS HOW MUCH OF THE TIME DID YOUR ASTHMA KEEP YOU FROM GETTING AS MUCH DONE AT WORK, SCHOOL OR AT HOME: NONE OF THE TIME
QUESTION_5 LAST FOUR WEEKS HOW WOULD YOU RATE YOUR ASTHMA CONTROL: WELL CONTROLLED
ACT_TOTALSCORE: 22
QUESTION_2 LAST FOUR WEEKS HOW OFTEN HAVE YOU HAD SHORTNESS OF BREATH: THREE TO SIX TIMES A WEEK

## 2022-10-04 ASSESSMENT — PAIN SCALES - GENERAL: PAINLEVEL: NO PAIN (0)

## 2022-10-25 NOTE — PROGRESS NOTES
SUBJECTIVE:   CC: Prachi is an 27 year old who presents for preventive health visit.     Patient has been advised of split billing requirements and indicates understanding: Yes     Answers for HPI/ROS submitted by the patient on 11/1/2022  If you checked off any problems, how difficult have these problems made it for you to do your work, take care of things at home, or get along with other people?: Somewhat difficult  PHQ9 TOTAL SCORE: 14    Pt is not fasting and did not have labs completed.    PAP is due per pt.    - Per pt was told to talk about thyroid and possible diabetes? Possibly get labs completed for testing per patient. FH of thyroid disease. Has had a couple of irregular periods.     Asthma-has rescue inhaler. Needs refilled. Usually worse in the spring/summer.  Unsure but same time of year can get worse. Never had allergy testing. Will refer.           Healthy Habits:     Getting at least 3 servings of Calcium per day:  Yes    Bi-annual eye exam:  Yes    Dental care twice a year:  NO    Sleep apnea or symptoms of sleep apnea:  None    Diet:  Regular (no restrictions)    Frequency of exercise:  1 day/week    Duration of exercise:  Less than 15 minutes    Taking medications regularly:  Yes    Medication side effects:  Not applicable    PHQ-2 Total Score: 2    Additional concerns today:  Yes        Today's PHQ-2 Score:   PHQ-2 ( 1999 Pfizer) 9/27/2022   Q1: Little interest or pleasure in doing things 0   Q2: Feeling down, depressed or hopeless 0   PHQ-2 Score 0   Q1: Little interest or pleasure in doing things Not at all   Q2: Feeling down, depressed or hopeless Not at all   PHQ-2 Score 0       Abuse: Current or Past (Physical, Sexual or Emotional) - No  Do you feel safe in your environment? Yes        Social History     Tobacco Use     Smoking status: Never     Smokeless tobacco: Never   Substance Use Topics     Alcohol use: Yes     Alcohol/week: 0.0 standard drinks     If you drink alcohol do you  typically have >3 drinks per day or >7 drinks per week? No    Alcohol Use 3/24/2017   Prescreen: >3 drinks/day or >7 drinks/week? The patient does not drink >3 drinks per day nor >7 drinks per week.   No flowsheet data found.    Reviewed orders with patient.  Reviewed health maintenance and updated orders accordingly - Yes  Lab work is in process  Labs reviewed in EPIC  BP Readings from Last 3 Encounters:   11/01/22 123/87   10/04/22 123/85   02/09/22 110/66    Wt Readings from Last 3 Encounters:   11/01/22 98.9 kg (218 lb)   10/04/22 97.5 kg (215 lb)   02/09/22 91.2 kg (201 lb)                  Patient Active Problem List   Diagnosis     Encounter for surveillance of injectable contraceptive     Mild intermittent asthma with acute exacerbation     Constipation, unspecified constipation type     External hemorrhoids     History reviewed. No pertinent surgical history.    Social History     Tobacco Use     Smoking status: Never     Smokeless tobacco: Never   Substance Use Topics     Alcohol use: Yes     Alcohol/week: 0.0 standard drinks     Family History   Problem Relation Age of Onset     Hypertension Mother      Diabetes Mother      Diabetes Father      Coronary Artery Disease Maternal Grandmother      Hyperlipidemia No family hx of      Cerebrovascular Disease No family hx of      Breast Cancer No family hx of      Colon Cancer No family hx of      Prostate Cancer No family hx of      Other Cancer No family hx of      Depression No family hx of      Anxiety Disorder No family hx of      Mental Illness No family hx of      Substance Abuse No family hx of      Anesthesia Reaction No family hx of      Asthma No family hx of      Osteoporosis No family hx of      Genetic Disorder No family hx of      Thyroid Disease No family hx of      Obesity No family hx of      Unknown/Adopted No family hx of          Current Outpatient Medications   Medication Sig Dispense Refill     PROAIR  (90 Base) MCG/ACT inhaler  Inhale 2 puffs into the lungs 3 times daily as needed for shortness of breath / dyspnea or wheezing 36 g 1       Breast Cancer Screening:  Any new diagnosis of family breast, ovarian, or bowel cancer? No    FHS-7: No flowsheet data found.    Patient under 40 years of age: Routine Mammogram Screening not recommended.   Pertinent mammograms are reviewed under the imaging tab.    History of abnormal Pap smear: NO - age 21-29 PAP every 3 years recommended  PAP / HPV 3/24/2017   PAP (Historical) NIL     Reviewed and updated as needed this visit by clinical staff                  Reviewed and updated as needed this visit by Provider                 Past Medical History:   Diagnosis Date     Raynaud disease       History reviewed. No pertinent surgical history.  OB History   No obstetric history on file.       Review of Systems   Constitutional: Negative for chills and fever.   HENT: Negative for congestion, ear pain, hearing loss and sore throat.    Eyes: Negative for pain and visual disturbance.   Respiratory: Negative for cough and shortness of breath.    Cardiovascular: Negative for chest pain, palpitations and peripheral edema.   Gastrointestinal: Negative for abdominal pain, constipation, diarrhea, heartburn, hematochezia and nausea.   Breasts:  Negative for tenderness, breast mass and discharge.   Genitourinary: Positive for pelvic pain and vaginal discharge. Negative for dysuria, frequency, genital sores, hematuria, urgency and vaginal bleeding.   Musculoskeletal: Positive for arthralgias and joint swelling. Negative for myalgias.   Skin: Negative for rash.   Neurological: Negative for dizziness, weakness, headaches and paresthesias.   Psychiatric/Behavioral: Negative for mood changes. The patient is not nervous/anxious.        OBJECTIVE:   /87   Pulse 88   Temp 98.2  F (36.8  C) (Tympanic)   Resp 16   Wt 98.9 kg (218 lb)   LMP 09/28/2022 (Approximate)   SpO2 99%   Breastfeeding No   BMI 35.19 kg/m     Physical Exam  GENERAL: alert, no distress and obese  EYES: Eyes grossly normal to inspection, PERRL and conjunctivae and sclerae normal  HENT: ear canals and TM's normal, nose and mouth without ulcers or lesions  NECK: no adenopathy, no asymmetry, masses, or scars and thyroid normal to palpation  RESP: lungs clear to auscultation - no rales, rhonchi or wheezes  BREAST: normal without masses, tenderness or nipple discharge and no palpable axillary masses or adenopathy  CV: regular rate and rhythm, normal S1 S2, no S3 or S4, no murmur, click or rub, no peripheral edema and peripheral pulses strong  ABDOMEN: soft, nontender, no hepatosplenomegaly, no masses and bowel sounds normal   (female): normal female external genitalia, normal urethral meatus, vaginal mucosa pink, moist, well rugated, and normal cervix without masses or discharge  MS: no gross musculoskeletal defects noted, no edema  SKIN: no suspicious lesions or rashes  NEURO: Normal strength and tone, mentation intact and speech normal  PSYCH: mentation appears normal, affect normal/bright    Diagnostic Test Results:  Labs reviewed in Epic    ASSESSMENT/PLAN:   (Z00.00) Routine general medical examination at a health care facility  (primary encounter diagnosis)  Comment:   Plan:     (J45.21) Mild intermittent asthma with acute exacerbation  Comment: doing well now but usually worse in summer per patient, will refer for allergy testing  Plan: PROAIR  (90 Base) MCG/ACT inhaler, Adult        Allergy/Asthma Referral            (Z68.35) BMI 35.0-35.9,adult  Comment:    Plan: TSH with free T4 reflex        I will f/u with labs    (Z12.4) Cervical cancer screening  Comment:   Plan: Pap Screen reflex to HPV if ASCUS - recommend         age 25 - 29            (Z13.1) Screening for diabetes mellitus  Comment:   Plan: Hemoglobin A1c, Basic metabolic panel  (Ca, Cl,        CO2, Creat, Gluc, K, Na, BUN)            (Z13.220) Screening, lipid  Comment:  Plan:  "Lipid panel reflex to direct LDL Non-fasting              Patient has been advised of split billing requirements and indicates understanding: Yes      COUNSELING:  Reviewed preventive health counseling, as reflected in patient instructions       Regular exercise       Healthy diet/nutrition       Vision screening       Hearing screening    Estimated body mass index is 34.7 kg/m  as calculated from the following:    Height as of 2/9/22: 1.676 m (5' 6\").    Weight as of 10/4/22: 97.5 kg (215 lb).    Weight management plan: Discussed healthy diet and exercise guidelines    She reports that she has never smoked. She has never used smokeless tobacco.    Patient Instructions     Preventive Health Recommendations  Female Ages 26 - 39  Yearly exam:   See your health care provider every year in order to    Review health changes.     Discuss preventive care.      Review your medicines if you your doctor has prescribed any.    Until age 30: Get a Pap test every three years (more often if you have had an abnormal result).    After age 30: Talk to your doctor about whether you should have a Pap test every 3 years or have a Pap test with HPV screening every 5 years.   You do not need a Pap test if your uterus was removed (hysterectomy) and you have not had cancer.  You should be tested each year for STDs (sexually transmitted diseases), if you're at risk.   Talk to your provider about how often to have your cholesterol checked.  If you are at risk for diabetes, you should have a diabetes test (fasting glucose).  Shots: Get a flu shot each year. Get a tetanus shot every 10 years.   Nutrition:     Eat at least 5 servings of fruits and vegetables each day.    Eat whole-grain bread, whole-wheat pasta and brown rice instead of white grains and rice.    Get adequate Calcium and Vitamin D.     Lifestyle    Exercise at least 150 minutes a week (30 minutes a day, 5 days of the week). This will help you control your weight and prevent " disease.    Limit alcohol to one drink per day.    No smoking.     Wear sunscreen to prevent skin cancer.    See your dentist every six months for an exam and cleaning.        Counseling Resources:  ATP IV Guidelines  Pooled Cohorts Equation Calculator  Breast Cancer Risk Calculator  BRCA-Related Cancer Risk Assessment: FHS-7 Tool  FRAX Risk Assessment  ICSI Preventive Guidelines  Dietary Guidelines for Americans, 2010  USDA's MyPlate  ASA Prophylaxis  Lung CA Screening    HARRISON Orantes [facility]

## 2022-11-01 ENCOUNTER — OFFICE VISIT (OUTPATIENT)
Dept: FAMILY MEDICINE | Facility: CLINIC | Age: 27
End: 2022-11-01
Payer: COMMERCIAL

## 2022-11-01 VITALS
WEIGHT: 218 LBS | OXYGEN SATURATION: 99 % | RESPIRATION RATE: 16 BRPM | SYSTOLIC BLOOD PRESSURE: 123 MMHG | HEART RATE: 88 BPM | DIASTOLIC BLOOD PRESSURE: 87 MMHG | BODY MASS INDEX: 35.19 KG/M2 | TEMPERATURE: 98.2 F

## 2022-11-01 DIAGNOSIS — Z00.00 ROUTINE GENERAL MEDICAL EXAMINATION AT A HEALTH CARE FACILITY: Primary | ICD-10-CM

## 2022-11-01 DIAGNOSIS — R63.5 WEIGHT GAIN: ICD-10-CM

## 2022-11-01 DIAGNOSIS — Z12.4 CERVICAL CANCER SCREENING: ICD-10-CM

## 2022-11-01 DIAGNOSIS — Z13.220 SCREENING, LIPID: ICD-10-CM

## 2022-11-01 DIAGNOSIS — Z13.1 SCREENING FOR DIABETES MELLITUS: ICD-10-CM

## 2022-11-01 DIAGNOSIS — J45.21 MILD INTERMITTENT ASTHMA WITH ACUTE EXACERBATION: ICD-10-CM

## 2022-11-01 LAB — HBA1C MFR BLD: 5.6 % (ref 0–5.6)

## 2022-11-01 PROCEDURE — 83036 HEMOGLOBIN GLYCOSYLATED A1C: CPT | Performed by: PHYSICIAN ASSISTANT

## 2022-11-01 PROCEDURE — 80048 BASIC METABOLIC PNL TOTAL CA: CPT | Performed by: PHYSICIAN ASSISTANT

## 2022-11-01 PROCEDURE — 84443 ASSAY THYROID STIM HORMONE: CPT | Performed by: PHYSICIAN ASSISTANT

## 2022-11-01 PROCEDURE — 36415 COLL VENOUS BLD VENIPUNCTURE: CPT | Performed by: PHYSICIAN ASSISTANT

## 2022-11-01 PROCEDURE — G0145 SCR C/V CYTO,THINLAYER,RESCR: HCPCS | Performed by: PHYSICIAN ASSISTANT

## 2022-11-01 PROCEDURE — 99213 OFFICE O/P EST LOW 20 MIN: CPT | Mod: 25 | Performed by: PHYSICIAN ASSISTANT

## 2022-11-01 PROCEDURE — 99395 PREV VISIT EST AGE 18-39: CPT | Performed by: PHYSICIAN ASSISTANT

## 2022-11-01 PROCEDURE — 80061 LIPID PANEL: CPT | Performed by: PHYSICIAN ASSISTANT

## 2022-11-01 RX ORDER — ALBUTEROL SULFATE 90 UG/1
2 AEROSOL, METERED RESPIRATORY (INHALATION) 3 TIMES DAILY PRN
Qty: 36 G | Refills: 1 | Status: SHIPPED | OUTPATIENT
Start: 2022-11-01

## 2022-11-01 ASSESSMENT — ENCOUNTER SYMPTOMS
ABDOMINAL PAIN: 0
FEVER: 0
BREAST MASS: 0
HEMATURIA: 0
COUGH: 0
CHILLS: 0
ARTHRALGIAS: 1
HEMATOCHEZIA: 0
EYE PAIN: 0
PALPITATIONS: 0
FREQUENCY: 0
DYSURIA: 0
DIZZINESS: 0
SORE THROAT: 0
NAUSEA: 0
PARESTHESIAS: 0
HEADACHES: 0
MYALGIAS: 0
HEARTBURN: 0
JOINT SWELLING: 1
CONSTIPATION: 0
SHORTNESS OF BREATH: 0
DIARRHEA: 0
NERVOUS/ANXIOUS: 0
WEAKNESS: 0

## 2022-11-01 ASSESSMENT — PATIENT HEALTH QUESTIONNAIRE - PHQ9
SUM OF ALL RESPONSES TO PHQ QUESTIONS 1-9: 14
10. IF YOU CHECKED OFF ANY PROBLEMS, HOW DIFFICULT HAVE THESE PROBLEMS MADE IT FOR YOU TO DO YOUR WORK, TAKE CARE OF THINGS AT HOME, OR GET ALONG WITH OTHER PEOPLE: SOMEWHAT DIFFICULT
SUM OF ALL RESPONSES TO PHQ QUESTIONS 1-9: 14

## 2022-11-02 ENCOUNTER — TELEPHONE (OUTPATIENT)
Dept: FAMILY MEDICINE | Facility: CLINIC | Age: 27
End: 2022-11-02

## 2022-11-02 NOTE — TELEPHONE ENCOUNTER
Prior Authorization Retail Medication Request    Medication/Dose: PROAIR  (90 Base) MCG/ACT inhaler  ICD code (if different than what is on RX):  Mild intermittent asthma with acute exacerbation [J45.21]   Previously Tried and Failed:    Rationale:      Insurance Phone #:  [phone]  Insurance ID:  [id number]      Pharmacy Information (if different than what is on RX)  Name:  [facility]  Phone:  [phone]

## 2022-11-03 LAB
ANION GAP SERPL CALCULATED.3IONS-SCNC: 7 MMOL/L (ref 3–14)
BKR LAB AP GYN ADEQUACY: NORMAL
BKR LAB AP GYN INTERPRETATION: NORMAL
BKR LAB AP HPV REFLEX: NORMAL
BKR LAB AP PREVIOUS ABNORMAL: NORMAL
BUN SERPL-MCNC: 11 MG/DL (ref 7–30)
CALCIUM SERPL-MCNC: 8.9 MG/DL (ref 8.5–10.1)
CHLORIDE BLD-SCNC: 108 MMOL/L (ref 94–109)
CHOLEST SERPL-MCNC: 267 MG/DL
CO2 SERPL-SCNC: 25 MMOL/L (ref 20–32)
CREAT SERPL-MCNC: 0.75 MG/DL (ref 0.52–1.04)
FASTING STATUS PATIENT QL REPORTED: NO
GFR SERPL CREATININE-BSD FRML MDRD: >90 ML/MIN/1.73M2
GLUCOSE BLD-MCNC: 68 MG/DL (ref 70–99)
HDLC SERPL-MCNC: 45 MG/DL
LDLC SERPL CALC-MCNC: 183 MG/DL
NONHDLC SERPL-MCNC: 222 MG/DL
PATH REPORT.COMMENTS IMP SPEC: NORMAL
PATH REPORT.COMMENTS IMP SPEC: NORMAL
PATH REPORT.RELEVANT HX SPEC: NORMAL
POTASSIUM BLD-SCNC: 4.5 MMOL/L (ref 3.4–5.3)
SODIUM SERPL-SCNC: 140 MMOL/L (ref 133–144)
TRIGL SERPL-MCNC: 194 MG/DL
TSH SERPL DL<=0.005 MIU/L-ACNC: 3.07 MU/L (ref 0.4–4)

## 2022-11-03 NOTE — TELEPHONE ENCOUNTER
PA Initiation    Medication: VENTOLINHFA 108 (90 Base) MCG/ACT inhaler PA INITIATED  Insurance Company: [employer] - Phone [phone] Fax [phone]  Pharmacy Filling the Rx: [id number] - [place] - [address] AT [facility] [place]  Filling Pharmacy Phone: [phone]  Filling Pharmacy Fax:    Start Date: 11/3/2022    Central Prior Authorization Team   Phone: [phone]

## 2022-11-03 NOTE — RESULT ENCOUNTER NOTE
Bri Velarde,       Your recent test results are attached, if you have any questions or concerns please feel free to contact me via e-mail or call [phone].  Sodium and potassium normal. Blood sugar (glucose) normal.  Creatinine and GFR normal, which means kidney function is normal.   Thyroid is normal.  Cholesterol is okay as you were not fasting.a1c shows no diabetes.   Repeat fasting labs next year.     Sincerely,  Amita Gillis PA-C

## 2022-11-22 NOTE — TELEPHONE ENCOUNTER
Prior Authorization Approval    Authorization Effective Date:  11/22/2022  Authorization Expiration Date:  11/22/2023  Medication: VENTOLINHFA 108 (90 Base) MCG/ACT inhaler PA APPROVED  Approved Dose/Quantity: 18 Grams/33 Days  Reference #:     Insurance Company: [employer] - Phone [phone] Fax [phone]  Expected CoPay:       CoPay Card Available:      Foundation Assistance Needed:    Which Pharmacy is filling the prescription (Not needed for infusion/clinic administered): [id number] - [place] - [facility] [address] AT [facility]  Pharmacy Notified: Yes  Patient Notified: Comment:  Pharmacy will notify patient.

## 2023-01-25 ENCOUNTER — OFFICE VISIT (OUTPATIENT)
Dept: URGENT CARE | Facility: URGENT CARE | Age: 28
End: 2023-01-25
Payer: COMMERCIAL

## 2023-01-25 VITALS
RESPIRATION RATE: 20 BRPM | OXYGEN SATURATION: 99 % | TEMPERATURE: 98 F | WEIGHT: 213.6 LBS | BODY MASS INDEX: 34.48 KG/M2 | DIASTOLIC BLOOD PRESSURE: 70 MMHG | SYSTOLIC BLOOD PRESSURE: 101 MMHG | HEART RATE: 96 BPM

## 2023-01-25 DIAGNOSIS — J01.90 ACUTE SINUSITIS WITH SYMPTOMS > 10 DAYS: ICD-10-CM

## 2023-01-25 DIAGNOSIS — H66.001 ACUTE SUPPURATIVE OTITIS MEDIA OF RIGHT EAR WITHOUT SPONTANEOUS RUPTURE OF TYMPANIC MEMBRANE, RECURRENCE NOT SPECIFIED: Primary | ICD-10-CM

## 2023-01-25 PROCEDURE — 99213 OFFICE O/P EST LOW 20 MIN: CPT | Performed by: FAMILY MEDICINE

## 2023-01-26 NOTE — PROGRESS NOTES
Chief complaint: ear concerns    Had a cold 2 weeks ago   Got better   Then 3 days ago had fever and chills   covid test negative  Fever finally broke   Was fine but then 2 hours ago started having ear pain    Ristill coughing  right ear    no chest pain or shortness of breath   No rash  Ill-contacts: boyfriend   Because of persistent and worsening symptoms came in to be seen    Problem list and histories reviewed & adjusted, as indicated.  Additional history: as documented    Problem list, Medication list, Allergies, and Medical/Social/Surgical histories reviewed in [facility] and updated as appropriate.    ROS:  Constitutional, HEENT, cardiovascular, pulmonary, gi and gu systems are negative, except as otherwise noted.    OBJECTIVE:                                                    /70   Pulse 96   Temp 98  F (36.7  C) (Tympanic)   Resp 20   Wt 96.9 kg (213 lb 9.6 oz)   SpO2 99%   BMI 34.48 kg/m    Body mass index is 34.48 kg/m .  GENERAL: healthy, alert and no distress  EYES: pink palpebral conjunctiva, anicteric sclera, pupils equally reactive to light and accomodation, extraocular muscles intact full and equal.  ENT: midline nasal septum, positive  nasal congestion   Left ear:no tragal tenderness, no mastoid tenderness normal tympaninc membrane   Right ear: no tragal tenderness, no mastoid tenderness erythematous and bulging tympaninc membrane   NECK: no adenopathy, no asymmetry or  masses  RESP: lungs clear to auscultation - no rales, rhonchi or wheezes  CV: regular rate and rhythm, normal S1 S2, no S3 or S4, no murmur, click or rub, no peripheral edema and peripheral pulses strong  MS: no gross musculoskeletal defects noted, no edema  NEURO: Normal strength and tone, mentation intact and speech normal    Diagnostic Test Results:  No results found for this or any previous visit (from the past 24 hour(s)).     ASSESSMENT/PLAN:                                                      No diagnosis found.       ICD-10-CM    1. Acute suppurative otitis media of right ear without spontaneous rupture of tympanic membrane, recurrence not specified  H66.001 amoxicillin-clavulanate (AUGMENTIN) 875-125 MG tablet      2. Acute sinusitis with symptoms > 10 days  J01.90 amoxicillin-clavulanate (AUGMENTIN) 875-125 MG tablet        History of double ear infection with tympaninc membrane rupture in the past and faiture to amoxicillin     Prescribed with augmentin  Side effects discussed warned about GI side effects and risk of cdiff.    Recommend follow up with primary care provider if no relief , sooner if worse    Adverse reactions of medications discussed.  Over the counter medications discussed.   Aware to come back in if with worsening symptoms or if no relief despite treatment plan  Patient voiced understanding and had no further questions.     MD Kathleen Mota MD  [facility]

## 2023-02-06 ENCOUNTER — OFFICE VISIT (OUTPATIENT)
Dept: URGENT CARE | Facility: URGENT CARE | Age: 28
End: 2023-02-06
Payer: COMMERCIAL

## 2023-02-06 VITALS
TEMPERATURE: 97 F | WEIGHT: 216.6 LBS | SYSTOLIC BLOOD PRESSURE: 138 MMHG | BODY MASS INDEX: 34.96 KG/M2 | HEART RATE: 74 BPM | OXYGEN SATURATION: 99 % | DIASTOLIC BLOOD PRESSURE: 84 MMHG

## 2023-02-06 DIAGNOSIS — J40 BRONCHITIS: Primary | ICD-10-CM

## 2023-02-06 PROCEDURE — 99213 OFFICE O/P EST LOW 20 MIN: CPT | Performed by: FAMILY MEDICINE

## 2023-02-06 RX ORDER — DOXYCYCLINE HYCLATE 100 MG
100 TABLET ORAL 2 TIMES DAILY
Qty: 14 TABLET | Refills: 0 | Status: SHIPPED | OUTPATIENT
Start: 2023-02-06 | End: 2023-02-27

## 2023-02-06 RX ORDER — PREDNISONE 20 MG/1
TABLET ORAL
Qty: 12 TABLET | Refills: 0 | Status: SHIPPED | OUTPATIENT
Start: 2023-02-06 | End: 2023-02-27

## 2023-02-06 NOTE — PROGRESS NOTES
(J40) Bronchitis  (primary encounter diagnosis)  Comment:   Plan: doxycycline hyclate (VIBRA-TABS) 100 MG tablet,        predniSONE (DELTASONE) 20 MG tablet              CHIEF COMPLAINT    Persistent cough.      HISTORY    This 27-year-old woman has had a cough for about a month.  Minimal sputum production.  No fever.    A couple of weeks ago she had influenza symptoms and had an ear infection and took Augmentin.  This did not seem to help her cough.    She does not smoke or vape.    She does have a history of intermittent asthma.  Uses as needed albuterol.        REVIEW OF SYSTEMS    No fever.  No ear pain or sore throat.  No chest pain.  No shortness of breath.      EXAM  /84 (BP Location: Right arm, Cuff Size: Adult Regular)   Pulse 74   Temp 97  F (36.1  C) (Tympanic)   Wt 98.2 kg (216 lb 9.6 oz)   SpO2 99%   BMI 34.96 kg/m      Tympanic membranes are not inflamed.  Pharynx WNL.  Mildly enlarged anterior cervical nodes.  Lungs occasional wheeze although minimal.  Nonlabored breathing.

## 2023-02-27 ENCOUNTER — ANCILLARY PROCEDURE (OUTPATIENT)
Dept: GENERAL RADIOLOGY | Facility: CLINIC | Age: 28
End: 2023-02-27
Attending: PHYSICIAN ASSISTANT
Payer: COMMERCIAL

## 2023-02-27 ENCOUNTER — OFFICE VISIT (OUTPATIENT)
Dept: FAMILY MEDICINE | Facility: CLINIC | Age: 28
End: 2023-02-27
Payer: COMMERCIAL

## 2023-02-27 VITALS
SYSTOLIC BLOOD PRESSURE: 128 MMHG | OXYGEN SATURATION: 98 % | WEIGHT: 213 LBS | BODY MASS INDEX: 34.23 KG/M2 | HEIGHT: 66 IN | TEMPERATURE: 97.5 F | HEART RATE: 108 BPM | RESPIRATION RATE: 18 BRPM | DIASTOLIC BLOOD PRESSURE: 72 MMHG

## 2023-02-27 DIAGNOSIS — R05.3 CHRONIC COUGH: ICD-10-CM

## 2023-02-27 DIAGNOSIS — R05.3 CHRONIC COUGH: Primary | ICD-10-CM

## 2023-02-27 LAB
BASOPHILS # BLD AUTO: 0 10E3/UL (ref 0–0.2)
BASOPHILS NFR BLD AUTO: 0 %
EOSINOPHIL # BLD AUTO: 0.4 10E3/UL (ref 0–0.7)
EOSINOPHIL NFR BLD AUTO: 4 %
ERYTHROCYTE [DISTWIDTH] IN BLOOD BY AUTOMATED COUNT: 12.6 % (ref 10–15)
HCT VFR BLD AUTO: 42.4 % (ref 35–47)
HGB BLD-MCNC: 14.1 G/DL (ref 11.7–15.7)
LYMPHOCYTES # BLD AUTO: 2.6 10E3/UL (ref 0.8–5.3)
LYMPHOCYTES NFR BLD AUTO: 25 %
MCH RBC QN AUTO: 29.6 PG (ref 26.5–33)
MCHC RBC AUTO-ENTMCNC: 33.3 G/DL (ref 31.5–36.5)
MCV RBC AUTO: 89 FL (ref 78–100)
MONOCYTES # BLD AUTO: 0.7 10E3/UL (ref 0–1.3)
MONOCYTES NFR BLD AUTO: 6 %
NEUTROPHILS # BLD AUTO: 6.8 10E3/UL (ref 1.6–8.3)
NEUTROPHILS NFR BLD AUTO: 65 %
PLATELET # BLD AUTO: 259 10E3/UL (ref 150–450)
RBC # BLD AUTO: 4.77 10E6/UL (ref 3.8–5.2)
WBC # BLD AUTO: 10.5 10E3/UL (ref 4–11)

## 2023-02-27 PROCEDURE — 99213 OFFICE O/P EST LOW 20 MIN: CPT | Performed by: PHYSICIAN ASSISTANT

## 2023-02-27 PROCEDURE — 36415 COLL VENOUS BLD VENIPUNCTURE: CPT | Performed by: PHYSICIAN ASSISTANT

## 2023-02-27 PROCEDURE — 85025 COMPLETE CBC W/AUTO DIFF WBC: CPT | Performed by: PHYSICIAN ASSISTANT

## 2023-02-27 PROCEDURE — 71046 X-RAY EXAM CHEST 2 VIEWS: CPT | Mod: TC | Performed by: RADIOLOGY

## 2023-02-27 RX ORDER — BENZONATATE 200 MG/1
200 CAPSULE ORAL 3 TIMES DAILY PRN
Qty: 30 CAPSULE | Refills: 1 | Status: SHIPPED | OUTPATIENT
Start: 2023-02-27 | End: 2023-11-13

## 2023-02-27 RX ORDER — PREDNISONE 20 MG/1
40 TABLET ORAL DAILY
Qty: 14 TABLET | Refills: 0 | Status: SHIPPED | OUTPATIENT
Start: 2023-02-27 | End: 2023-03-06

## 2023-02-27 ASSESSMENT — PAIN SCALES - GENERAL: PAINLEVEL: MILD PAIN (3)

## 2023-02-27 ASSESSMENT — ENCOUNTER SYMPTOMS: COUGH: 1

## 2023-02-27 NOTE — PROGRESS NOTES
"  Assessment & Plan     Chronic cough  Ongoing since early January  - XR Chest 2 Views; Future  - CBC with platelets and differential; Future  - predniSONE (DELTASONE) 20 MG tablet; Take 2 tablets (40 mg) by mouth daily for 7 days  - benzonatate (TESSALON) 200 MG capsule; Take 1 capsule (200 mg) by mouth 3 times daily as needed for cough  - CBC with platelets and differential  Negative CBC and CXR supports lack of pneumonia or other acute/subacute pathology. Likely chronic cough caused by swelling. Steroid and suppressant therapy given.        Return in about 4 weeks (around 3/27/2023) for if not improving or sooner if worsening .    RICARDO RICHARDSON PA-C  [facility]   Prachi is a 27 year old, presenting for the following health issues:  Cough    Had flu over weekend-high fever, chills  Was seen on 1/25 and 2/06  Feels the same this time as 1/25/23 appt         Cough    History of Present Illness       Reason for visit:  Bronchitis follow up    She eats 0-1 servings of fruits and vegetables daily.She consumes 1 sweetened beverage(s) daily.She exercises with enough effort to increase her heart rate 9 or less minutes per day.  She exercises with enough effort to increase her heart rate 3 or less days per week.   She is taking medications regularly.             Review of Systems   Respiratory: Positive for cough.       Constitutional, HEENT, cardiovascular, pulmonary, gi and gu systems are negative, except as otherwise noted.      Objective    /72   Pulse 108   Temp 97.5  F (36.4  C) (Tympanic)   Resp 18   Ht 1.676 m (5' 6\")   Wt 96.6 kg (213 lb)   SpO2 98%   BMI 34.38 kg/m    Body mass index is 34.38 kg/m .  Physical Exam   GENERAL: healthy, alert and no distress  EYES: Eyes grossly normal to inspection, PERRL and conjunctivae and sclerae normal  HENT: ear canals and TM's normal, nose and mouth without ulcers or lesions  NECK: no adenopathy, no asymmetry, masses, or scars " and thyroid normal to palpation  RESP: lungs clear to auscultation - no rales, rhonchi or wheezes  CV: regular rate and rhythm, normal S1 S2, no S3 or S4, no murmur, click or rub, no peripheral edema and peripheral pulses strong  ABDOMEN: soft, nontender, no hepatosplenomegaly, no masses and bowel sounds normal  PSYCH: mentation appears normal, affect normal/bright

## 2023-03-14 NOTE — PROGRESS NOTES
Assessment & Plan     Irregular menses  Will check labs and US for pcos  Discussed limitations for diagnosing endometriosis  Will have see obgyn if I do not find anything with testing or if it is inconclusive    - TSH with free T4 reflex; Future  - Testosterone Free and Total; Future  - Hemoglobin A1c; Future  - US Pelvic Complete with Transvaginal; Future  - Estradiol; Future  - Progesterone; Future      Billin min spent on patient today including chart review, history, exam, and explaining treatment plan and follow-up.     Patient Instructions   Call imaging to schedule Ultrasound of uterus/ovaries  [phone]  I will follow up with labs and ultrasound results        Return in about 4 weeks (around 2023) for if not improving.    HARRISON Orantes [facility]    Nevin Velarde is a 27 year old accompanied by her Self, presenting for the following health issues:  Menstrual Problem      History of Present Illness       Reason for visit:  Missed menstrual cycles    She eats 0-1 servings of fruits and vegetables daily.She consumes 2 sweetened beverage(s) daily.She exercises with enough effort to increase her heart rate 10 to 19 minutes per day.  She exercises with enough effort to increase her heart rate 3 or less days per week.   She is taking medications regularly.       Concern-irregular periods.     Was every other month until november then didn't get until this past saturday. july was the last normal period.   Period is ending now. [place]  went off around . didnt get until . Then was regular until this past july.   Has always had heavy bleeding. This period was no different.   Has had pelvic pain previously, in 2017 had ultrasound which was normal.   Wondering about pcos or endometriosis per patient.   Chance of pregnancy? No.     Last a1c was borderline prediabetic.       Review of Systems   Constitutional, HEENT, cardiovascular, pulmonary,  GI, , musculoskeletal, neuro, skin, endocrine and psych systems are negative, except as otherwise noted.      Objective    /85   Pulse 92   Temp 97.5  F (36.4  C) (Tympanic)   Resp 14   Wt 98 kg (216 lb)   LMP 03/18/2023 (Approximate)   SpO2 99%   Breastfeeding No   BMI 34.86 kg/m    Body mass index is 34.86 kg/m .  Physical Exam   GENERAL: alert, no distress and obese  NECK: no adenopathy, no asymmetry, masses, or scars and thyroid normal to palpation  RESP: lungs clear to auscultation - no rales, rhonchi or wheezes  CV: regular rate and rhythm, normal S1 S2, no S3 or S4, no murmur, click or rub, no peripheral edema and peripheral pulses strong  ABDOMEN: soft, nontender, no hepatosplenomegaly, no masses and bowel sounds normal  MS: no gross musculoskeletal defects noted, no edema  SKIN: no suspicious lesions or rashes  NEURO: Normal strength and tone, mentation intact and speech normal  PSYCH: mentation appears normal, affect normal/bright

## 2023-03-16 NOTE — TELEPHONE ENCOUNTER
Provider E-Visit time total (minutes): 10   Quality 47: Advance Care Plan: Advance Care Planning discussed and documented in the medical record; patient did not wish or was not able to name a surrogate decision maker or provide an advance care plan. Detail Level: Detailed Quality 431: Preventive Care And Screening: Unhealthy Alcohol Use - Screening: Patient not identified as an unhealthy alcohol user when screened for unhealthy alcohol use using a systematic screening method Quality 110: Preventive Care And Screening: Influenza Immunization: Influenza immunization was not ordered or administered, reason not given Quality 111:Pneumonia Vaccination Status For Older Adults: Pneumococcal vaccine (PPSV23) was not administered on or after patientâs 60th birthday and before the end of the measurement period, reason not otherwise specified Quality 130: Documentation Of Current Medications In The Medical Record: Current Medications Documented Quality 226: Preventive Care And Screening: Tobacco Use: Screening And Cessation Intervention: Patient screened for tobacco use and is an ex/non-smoker

## 2023-03-21 ENCOUNTER — OFFICE VISIT (OUTPATIENT)
Dept: FAMILY MEDICINE | Facility: CLINIC | Age: 28
End: 2023-03-21
Payer: COMMERCIAL

## 2023-03-21 VITALS
SYSTOLIC BLOOD PRESSURE: 119 MMHG | DIASTOLIC BLOOD PRESSURE: 85 MMHG | OXYGEN SATURATION: 99 % | WEIGHT: 216 LBS | RESPIRATION RATE: 14 BRPM | TEMPERATURE: 97.5 F | HEART RATE: 92 BPM | BODY MASS INDEX: 34.86 KG/M2

## 2023-03-21 DIAGNOSIS — N92.6 IRREGULAR MENSES: Primary | ICD-10-CM

## 2023-03-21 LAB — HBA1C MFR BLD: 5.8 % (ref 0–5.6)

## 2023-03-21 PROCEDURE — 84443 ASSAY THYROID STIM HORMONE: CPT | Performed by: PHYSICIAN ASSISTANT

## 2023-03-21 PROCEDURE — 84144 ASSAY OF PROGESTERONE: CPT | Performed by: PHYSICIAN ASSISTANT

## 2023-03-21 PROCEDURE — 36415 COLL VENOUS BLD VENIPUNCTURE: CPT | Performed by: PHYSICIAN ASSISTANT

## 2023-03-21 PROCEDURE — 84270 ASSAY OF SEX HORMONE GLOBUL: CPT | Performed by: PHYSICIAN ASSISTANT

## 2023-03-21 PROCEDURE — 83036 HEMOGLOBIN GLYCOSYLATED A1C: CPT | Performed by: PHYSICIAN ASSISTANT

## 2023-03-21 PROCEDURE — 82670 ASSAY OF TOTAL ESTRADIOL: CPT | Performed by: PHYSICIAN ASSISTANT

## 2023-03-21 PROCEDURE — 84403 ASSAY OF TOTAL TESTOSTERONE: CPT | Performed by: PHYSICIAN ASSISTANT

## 2023-03-21 PROCEDURE — 99214 OFFICE O/P EST MOD 30 MIN: CPT | Performed by: PHYSICIAN ASSISTANT

## 2023-03-21 ASSESSMENT — ASTHMA QUESTIONNAIRES: ACT_TOTALSCORE: 25

## 2023-03-21 ASSESSMENT — PAIN SCALES - GENERAL: PAINLEVEL: NO PAIN (0)

## 2023-03-21 NOTE — PATIENT INSTRUCTIONS
Call imaging to schedule Ultrasound of uterus/ovaries  [phone]  I will follow up with labs and ultrasound results

## 2023-03-22 LAB
ESTRADIOL SERPL-MCNC: 45 PG/ML
PROGEST SERPL-MCNC: 0.2 NG/ML
SHBG SERPL-SCNC: 36 NMOL/L (ref 30–135)
TSH SERPL DL<=0.005 MIU/L-ACNC: 2.86 MU/L (ref 0.4–4)

## 2023-03-23 LAB
TESTOST FREE SERPL-MCNC: 0.44 NG/DL
TESTOST SERPL-MCNC: 26 NG/DL (ref 8–60)

## 2023-03-27 ENCOUNTER — ANCILLARY PROCEDURE (OUTPATIENT)
Dept: ULTRASOUND IMAGING | Facility: CLINIC | Age: 28
End: 2023-03-27
Attending: PHYSICIAN ASSISTANT
Payer: COMMERCIAL

## 2023-03-27 DIAGNOSIS — N92.6 IRREGULAR MENSES: ICD-10-CM

## 2023-03-27 PROCEDURE — 76856 US EXAM PELVIC COMPLETE: CPT | Mod: TC | Performed by: RADIOLOGY

## 2023-03-27 PROCEDURE — 76830 TRANSVAGINAL US NON-OB: CPT | Mod: TC | Performed by: RADIOLOGY

## 2023-03-28 ENCOUNTER — TELEPHONE (OUTPATIENT)
Dept: FAMILY MEDICINE | Facility: CLINIC | Age: 28
End: 2023-03-28
Payer: COMMERCIAL

## 2023-03-28 DIAGNOSIS — N92.6 IRREGULAR MENSES: Primary | ICD-10-CM

## 2023-03-28 NOTE — TELEPHONE ENCOUNTER
Provider: You did not give a time frame that the patient needed to see GYN in. TC was advised to assist the patient in making an appointment by the end of the week. They were not able to do this. Please advise.  This may require a provider to provider call.  Per TC - they tried 4 locations and patient will not miss work at this time.  No GYN appointment on the books at this time. Thank you. Aylin Huang R.N.    Patient notified of provider's message as written below. The patient was warm transferred to  at [place] and they were asked to assist patient in making a lab appointment tomorrow and a GYN appointment by the end of the week (3/31/2023).They verified they understood. Patient verbalized good understanding, had no further questions and needed no further support.Aylin Huang R.N.

## 2023-03-28 NOTE — TELEPHONE ENCOUNTER
Was able to schedule pt lab appointment for 3/29 that fit her schedule at 5pm. Pt sated she couldn't do earlier as she works until 2pm.   Unable to find an appointment in OB that works and transferred back to RN's.  Dia Bishop,

## 2023-03-28 NOTE — TELEPHONE ENCOUNTER
Patient was transferred back to RNs from , patient unsure why she was transferred.    Patient states that they can't find an appt this week that fits her schedule (must be after 2 pm due to work), but that she'll wait for a call back. Did reinforce with patient importance of getting this appointment scheduled to rule out ectopic pregnancy and that we will try to get our providers involved as well to get an appt. Patient verbalized understanding.     Patient states she is OK being seen at any  OBGYN clinic within ~30 minutes from [place]. Writer asked patient if she was offered any appts at [place], patient stated no, but she would go there.     Writer then placed patient on hold and spoke with [place] [facility] department, where writer was told they had no appts as well, however they would reach out to their provider group to see if anyone could do an add-on due to nature of appt. [place] [facility] stated they would reach out to patient if they are able to fit her in.    Writer relayed above info to patient, patient verbalized understanding. Informed patient to wait for call back from either our office or OBGYN office for available appts, otherwise plan to go to lab tomorrow as scheduled. No further questions at this time.    Encounter already routed to provider to review/advise.      MICHAEL Soria, RN  [facility]

## 2023-03-28 NOTE — RESULT ENCOUNTER NOTE
PLEASE CALL PATIENT:  Dear Prachi,      It was a pleasure to see you at your recent office visit.  Your test results are listed below.  Ultrasound shows a mass of unknown origin therefore we will need to do an HCG test to be sure this is not an ectopic pregnancy (pregnancy in the wrong place) and have you follow up with OBGYN.  If pregnancy test is negative they may need to do a pelvic MRI but i would let obgyn order this as they would determine what to do with the results.   I have ordered the blood pregnancy test,  but please help patient schedule a f/u with obgyn as well unless she has already done so.         If you have any questions or concerns, please call the clinic at [phone].    Sincerely,  Amita Gillis PA-C     denies

## 2023-03-28 NOTE — TELEPHONE ENCOUNTER
----- Message from Amita Gillis PA-C sent at 3/28/2023  2:34 PM CDT -----  PLEASE CALL PATIENT:  Dear Prachi,      It was a pleasure to see you at your recent office visit.  Your test results are listed below.  Ultrasound shows a mass of unknown origin therefore we will need to do an HCG test to be sure this is not an ectopic pregnancy (pregnancy in the wrong place) and have you follow up with OBGYN.  If pregnancy test is negative they may need to do a pelvic MRI but i would let obgyn order this as they would determine what to do with the results.   I have ordered the blood pregnancy test,  but please help patient schedule a f/u with obgyn as well unless she has already done so.         If you have any questions or concerns, please call the clinic at [phone].    Sincerely,  Amita Gillis PA-C

## 2023-03-29 ENCOUNTER — LAB (OUTPATIENT)
Dept: LAB | Facility: CLINIC | Age: 28
End: 2023-03-29
Payer: COMMERCIAL

## 2023-03-29 DIAGNOSIS — N92.6 IRREGULAR MENSES: ICD-10-CM

## 2023-03-29 LAB — B-HCG SERPL-ACNC: <1 IU/L (ref 0–5)

## 2023-03-29 PROCEDURE — 36415 COLL VENOUS BLD VENIPUNCTURE: CPT

## 2023-03-29 PROCEDURE — 84702 CHORIONIC GONADOTROPIN TEST: CPT

## 2023-03-30 ENCOUNTER — TELEPHONE (OUTPATIENT)
Dept: FAMILY MEDICINE | Facility: CLINIC | Age: 28
End: 2023-03-30

## 2023-03-30 ENCOUNTER — TELEPHONE (OUTPATIENT)
Dept: OBGYN | Facility: CLINIC | Age: 28
End: 2023-03-30

## 2023-03-30 ENCOUNTER — ANCILLARY PROCEDURE (OUTPATIENT)
Dept: MRI IMAGING | Facility: CLINIC | Age: 28
End: 2023-03-30
Attending: PHYSICIAN ASSISTANT
Payer: COMMERCIAL

## 2023-03-30 DIAGNOSIS — R19.00 PELVIC MASS: Primary | ICD-10-CM

## 2023-03-30 DIAGNOSIS — R19.00 PELVIC MASS: ICD-10-CM

## 2023-03-30 PROCEDURE — 72197 MRI PELVIS W/O & W/DYE: CPT | Mod: GC | Performed by: RADIOLOGY

## 2023-03-30 PROCEDURE — A9585 GADOBUTROL INJECTION: HCPCS | Performed by: RADIOLOGY

## 2023-03-30 RX ORDER — GADOBUTROL 604.72 MG/ML
10 INJECTION INTRAVENOUS ONCE
Status: COMPLETED | OUTPATIENT
Start: 2023-03-30 | End: 2023-03-30

## 2023-03-30 RX ADMIN — GADOBUTROL 10 ML: 604.72 INJECTION INTRAVENOUS at 16:51

## 2023-03-30 NOTE — TELEPHONE ENCOUNTER
3/30/2023  9:26 AM CDT Back to Top      PLEASE CALL PATIENT:  Dear Prachi,      It was a pleasure to see you at your recent office visit.  Your test results are listed below.  Pregnancy test is negative. I have ordered the pelvic MRI as next step so that when you see OBGYn they will have these results. Please schedule with them as well but schedule imaging asap. I think some are open on weekends to do imaging. This will help us see what the lesion is.             If you have any questions or concerns, please call the clinic at [phone].     Sincerely,  Amita Gillis PA-C         This writer attempted to contact Prachi on 03/30/23      Reason for call results, provider plan (**See also previous TE in Chart Review from 3/28, patient having trouble getting in with GYN, no openings at several clinics, does not appear to have scheduled as of yet)  and left message to call back to RN team at AN clinic.      If patient calls back:   Registered Nurse called. Send to RN team.        Namrata Shin RN  Clinical Triage/Primary Care  [facility]

## 2023-03-30 NOTE — TELEPHONE ENCOUNTER
Pt is scheduled for an pelvis MRI on 3/30.  She has an appt scheduled with BRITTANY Fairbanks CNP, on 4/18 for an MRI f/u.  It appears that an FP provider order the pelvic MRI and advised pt follow up with OB/GYN to discuss results and symptoms.    Routing to Pham to assure that it is appropriate for her to see pt for her concerns (see 3/21 FP OV and 3/30 TE from FP provider) and if waiting until 4/18 is okay.    Charmaine Benson RN

## 2023-03-30 NOTE — TELEPHONE ENCOUNTER
Patient calling back and read providers note. Patient understands message and verbalizes good understanding of plan of care.    Patient given MRI imaging number at [phone]. Patient has appointment scheduled today.    Patient requesting information on how soon/when to set up appointment with OBGYN. After huddling with provider, provider recommends waiting at least one day for the imaging to be done and read. It is ok per provider for patient to have appointment as late as Monday, April 3rd.

## 2023-03-30 NOTE — TELEPHONE ENCOUNTER
[facility] Call Center    Phone Message    May a detailed message be left on voicemail: yes     Reason for Call: Other:     Pt scheduled an appt to review MRI results for 04/18, but they are requesting a message via [employer] if the clinic needs to see them sooner.    Action Taken: Message routed to:  [facility] p [phone]    Travel Screening: Not Applicable

## 2023-03-30 NOTE — TELEPHONE ENCOUNTER
Called patient to discuss plan of care. Ok per provider to schedule OBGYN appointment one day after MRI imaging. Gave patient OB number to call and schedule with. Patient will call and schedule today. Advised to call [phone] if any other questions or concerns arise.    Anju DUNCAN  [facility]

## 2023-03-30 NOTE — RESULT ENCOUNTER NOTE
PLEASE CALL PATIENT:  Dear Prachi,      It was a pleasure to see you at your recent office visit.  Your test results are listed below.  Pregnancy test is negative. I have ordered the pelvic MRI as next step so that when you see OBGYn they will have these results. Please schedule with them as well but schedule imaging asap. I think some are open on weekends to do imaging. This will help us see what the lesion is.           If you have any questions or concerns, please call the clinic at [phone].    Sincerely,  Amita Gillis PA-C

## 2023-03-31 NOTE — TELEPHONE ENCOUNTER
RN called and spoke with patient, relaying lab results and provider notes. Patient verbalized understanding and had no further questions.     Shalini Britt RN on 3/31/2023 at 11:28 AM

## 2023-03-31 NOTE — TELEPHONE ENCOUNTER
MRI results not available yet, await them to be finalized and can then determine if patient needs to be seen sooner than scheduled and if she is ok to see me vs needing to see physician. HCG was negative, which is reassuring. Pham SOTO CNP

## 2023-04-05 ENCOUNTER — OFFICE VISIT (OUTPATIENT)
Dept: OBGYN | Facility: CLINIC | Age: 28
End: 2023-04-05
Payer: COMMERCIAL

## 2023-04-05 VITALS
OXYGEN SATURATION: 98 % | SYSTOLIC BLOOD PRESSURE: 121 MMHG | BODY MASS INDEX: 34.75 KG/M2 | DIASTOLIC BLOOD PRESSURE: 78 MMHG | HEART RATE: 81 BPM | WEIGHT: 216.2 LBS | HEIGHT: 66 IN

## 2023-04-05 DIAGNOSIS — N83.209 CYST OF OVARY, UNSPECIFIED LATERALITY: Primary | ICD-10-CM

## 2023-04-05 DIAGNOSIS — N92.6 IRREGULAR MENSES: ICD-10-CM

## 2023-04-05 PROCEDURE — 99203 OFFICE O/P NEW LOW 30 MIN: CPT | Performed by: NURSE PRACTITIONER

## 2023-04-05 NOTE — PATIENT INSTRUCTIONS
If you have any questions regarding your visit, Please contact your care team.     [facility]: [phone]  To Schedule an Appointment 24/7  Call: [phone] TELEPHONE [facility]     Pham Mcclain- APRN CNP      Gopal Gonzalez-Surgery Scheduler  Jacqueline-Surgery Scheduler         Monday 7:30 am-5:00 pm    Tuesday 8:00 am-4:00 pm    Wednesday 7:30 am-4:00 pm    Thursday 8:00 am-11:00 am    Friday 7:30 am-4:00 pm [address] [place] [address] [phone] ask for [facility]  [phone] Fax    Imaging Scheduling all locations  [phone]    [facility]  [address] [place] [address] Dr.  [place], [place] [id number] [phone]         Urgent Care locations:  [facility]   Monday-Friday  10 am - 8 pm  Saturday and Sunday   9 am - 5 pm     [phone]   If you need a medication refill, please contact your pharmacy. Please allow 3 business days for your refill to be completed.  As always, Thank you for trusting us with your healthcare needs!      see additional instructions from your care team below

## 2023-04-05 NOTE — PROGRESS NOTES
Assessment & Plan     Cyst of ovary, unspecified laterality  Reviewed her imaging studies and discussed ovarian cysts as well as uterine fibroids. Explained the difference between different types of cysts. Discussed the increased concern associated with increasing complexity.  Discussed the risk of rupture and torsion associated with the different types and sizes of cysts. We will plan to monitor with ultrasound in 4-6 months, to call sooner if she begins to have symptoms from the cyst. Patient is given an opportunity to ask questions and have them answered.  - US Pelvic Transabdominal and Transvaginal; Future    Irregular menses  We discussed her recent change in cycles. Prefers not to restart on a hormonal medication at this time to regulate cycles. Discussed recommendation to have bleeding at least every 3 months and rationale. Patient will plan to call/follow up if she does not have a cycle in 3 months at which time she will be amenable to a progesterone challenge. If she has another prolonged episode of no bleeding, consider if additional labs are appropriate. If she decides she would like to start on medication to regulate cycles, will follow up as well. Patient is given an opportunity to ask questions and have them answered.    34 minutes spent by me on the date of the encounter doing chart review, history and exam, documentation and further activities per the note     BRITTANY Cerrato CNP  [facility]   Prachi is a 27 year old, presenting for the following health issues:  Consult (unilocular cyst/ intramural fibroid )    HPI     Consult- Unilocular cyst/ Intramural fibroid    Patient was seen last month for irregular menstrual cycles. On Depo Provera for about 3 years and cycles did not resume for about 2 more years. They were regular at that time until this past July. After July cycle, had a cycle in November and then no cycle until March.   Saw primary care and  labs done-TSH, Testosterone, A1c, Estradiol and Progesterone.   Ultrasound was ordered:     HISTORY: Irregular menses  COMPARISON: None  TECHNIQUE: Transabdominal scans were performed. Endovaginal ultrasound  was performed to better visualize the adnexa.     FINDINGS:     UTERUS: 6 x 5 x 3 cm. Probable 1.3 cm intramural fibroid mid left.     ENDOMETRIUM: 6 mm. Normal smooth endometrium.     RIGHT OVARY: 3 x 2.5 x 2.2 cm. Normal with flow demonstrated.     LEFT OVARY: 2.9 x 1.8 x 1.7 cm. 2 x 1.5 cm hypoechoic mass between the  uterus and right ovary, with some peripheral Doppler flow.     No significant free fluid.                                                                      IMPRESSION:  1.  2 cm mass between the uterus and right ovary of uncertain  etiology. Recommend beta hCG, and possible pelvic MRI for further  evaluation.  2.  Small intramural fibroid.    Quant HCG was negative and then patient underwent a pelvic MRI:    EXAMINATION: MR PELVIS (GYN) W/O & W CONTRAST, 3/30/2023 4:52 PM     COMPARISON: Pelvic ultrasound 3/27/2023     HISTORY: mass on US. Negative hcg.; Pelvic mass     TECHNIQUE: Multiplanar, multisequence imaging was obtained of the  pelvis without and with intravenous contrast. Contrast dose: 10.0mL  Gadavist     FINDINGS:      Intrapelvic organs:  Retroflexed uterus. 1.5 cm intramural fibroid in the posterior mid  uterus. The endometrium measures approximately 6 mm in thickness.  Several small nabothian cysts.     Normal follicular architecture of the ovaries. 1.8 cm dominant  follicle. Medial to the right ovary, there is a thick-walled  unilocular lesion measuring 2.0 x 1.5 cm which demonstrates  progressive mural enhancement on the dynamic postcontrast sequences.  T1/T2 hyperintense internal debris/contents.     No abnormally dilated loops of bowel in the visualized pelvis. No  enlarged lymph nodes in the visualized portion of the pelvis. The  urinary bladder is within normal  "limits.     Bones and soft tissues:  No abnormal or suspicious osseous lesion. Normal marrow signal  pattern.                                                                      IMPRESSION:  1. 2.0 x 1.5 cm thick-walled unilocular cyst with internal  hemorrhagic/proteinaceous content. Differential diagnosis include  hemorrhagic paraovarian cyst versus endometriosis. Recommend follow up  ultrasound in 3-6 months to confirm stability/resolution.   2. 1.5 cm intramural fibroid in the posterior mid uterus.     Presents today in follow up.    Review of Systems   Constitutional, HEENT, cardiovascular, pulmonary, gi and gu systems are negative, except as otherwise noted.      Objective    /78 (BP Location: Right arm, Patient Position: Sitting, Cuff Size: Adult Large)   Pulse 81   Ht 1.676 m (5' 6\")   Wt 98.1 kg (216 lb 3.2 oz)   LMP 03/18/2023 (Exact Date)   SpO2 98%   BMI 34.90 kg/m    Body mass index is 34.9 kg/m .  Physical Exam   GENERAL: healthy, alert and no distress   (female): normal female external genitalia, normal urethral meatus, vaginal mucosa, normal cervix/adnexa/uterus without masses or discharge  MS: no gross musculoskeletal defects noted, no edema  SKIN: no suspicious lesions or rashes  PSYCH: mentation appears normal, affect normal/bright    "

## 2023-06-03 ENCOUNTER — HEALTH MAINTENANCE LETTER (OUTPATIENT)
Age: 28
End: 2023-06-03

## 2023-06-08 NOTE — PROGRESS NOTES
Patient Quality Outreach    Patient is due for the following:   There are no preventive care reminders to display for this patient.    Next Steps:   Patient has upcoming appointment, these items will be addressed at that time.    Type of outreach:    Chart review performed, no outreach needed.    Next Steps:  Reach out within 90 days via none.    Max number of attempts reached: No. Will try again in 90 days if patient still on fail list.    Questions for provider review:    None           Myrna Norris MA

## 2023-08-01 ENCOUNTER — MYC MEDICAL ADVICE (OUTPATIENT)
Dept: OBGYN | Facility: CLINIC | Age: 28
End: 2023-08-01
Payer: COMMERCIAL

## 2023-08-02 ENCOUNTER — ANCILLARY PROCEDURE (OUTPATIENT)
Dept: ULTRASOUND IMAGING | Facility: CLINIC | Age: 28
End: 2023-08-02
Attending: NURSE PRACTITIONER
Payer: COMMERCIAL

## 2023-08-02 DIAGNOSIS — N83.209 CYST OF OVARY, UNSPECIFIED LATERALITY: ICD-10-CM

## 2023-08-02 PROCEDURE — 76856 US EXAM PELVIC COMPLETE: CPT | Mod: TC | Performed by: RADIOLOGY

## 2023-08-02 PROCEDURE — 76830 TRANSVAGINAL US NON-OB: CPT | Mod: TC | Performed by: RADIOLOGY

## 2023-09-24 ENCOUNTER — OFFICE VISIT (OUTPATIENT)
Dept: URGENT CARE | Facility: URGENT CARE | Age: 28
End: 2023-09-24
Payer: COMMERCIAL

## 2023-09-24 VITALS
WEIGHT: 214.2 LBS | SYSTOLIC BLOOD PRESSURE: 117 MMHG | TEMPERATURE: 97 F | HEART RATE: 84 BPM | OXYGEN SATURATION: 100 % | DIASTOLIC BLOOD PRESSURE: 84 MMHG | BODY MASS INDEX: 34.57 KG/M2

## 2023-09-24 DIAGNOSIS — R07.0 THROAT PAIN: Primary | ICD-10-CM

## 2023-09-24 DIAGNOSIS — H92.01 RIGHT EAR PAIN: ICD-10-CM

## 2023-09-24 PROCEDURE — 99213 OFFICE O/P EST LOW 20 MIN: CPT

## 2023-09-24 NOTE — PROGRESS NOTES
ASSESSMENT:  (R07.0) Throat pain  (primary encounter diagnosis)    (H92.01) Right ear pain    PLAN:  Informed the patient to get plenty of rest, drink fluids and use Tylenol and or ibuprofen as needed for pain with the maximum dose of Tylenol being 4000 mg in 24 period of time and to take ibuprofen with food avoid upset stomach.  We also discussed trying warm salt water gargles, hot/warm water or tea with honey under lemon and or Cepacol lozenges or spray for the sore throat.  Informed the patient to return to clinic with any new or worsening symptoms.  Patient acknowledged her understanding of the above plan.    The use of Dragon/[employer] services may have been used to construct the content in this note; any grammatical or spelling errors are non-intentional. Please contact the author of this note directly if you are in need of any clarification.      BRITTANY Velásquez CNP    SUBJECTIVE:  Prachi Ross is a 28 year old female who presents with right ear pain and sore throat for 1 day.   Severity: severe   Additional symptoms include swollen lymph nodes.    Treatment: Tylenol    The patient did not do an at home COVID test.     ROS:  Negative except noted above.      OBJECTIVE:   GENERAL: no acute distress  EYES: EOMI,  PERRL, conjunctiva clear  The right TM is normal: no effusions, no erythema, and normal landmarks     The right auditory canal is normal and without drainage, edema or erythema  The left TM is normal: no effusions, no erythema, and normal landmarks  The left auditory canal is normal and without drainage, edema or erythema  Oropharynx exam is normal: no lesions, erythema, adenopathy or exudate.  NECK: mild bilateral anterior cervical adenopathy  RESP: lungs clear to auscultation - no rales, rhonchi or wheezes  CV: regular rates and rhythm, normal S1 S2, no murmur noted  SKIN: no suspicious lesions or rashes

## 2023-09-25 NOTE — PATIENT INSTRUCTIONS
Get plenty of rest and drink fluids.  Can use Tylenol and/or ibuprofen as needed for pain.  Maximum dose of Tylenol is 4000mg in a 24 hour period of time.  Take ibuprofen with food to avoid stomach upset.  You can also try warm salt water gargles, hot/warm water or tea with honey and/or lemon and/or Cepacol lozenges or spray for your sore throat.

## 2023-10-02 NOTE — PATIENT INSTRUCTIONS
If you have any questions regarding your visit, Please contact your care team.    [employer]: [phone]      [facility] CLINIC HOURS TELEPHONE NUMBER   Lisa Richard DO.    IFRAH Glass-Surgery Scheduler  Jacqueline - Surgery Scheduler    DAKOTA Montano, DAKOTA Loya RN     Monday, Thursday  [place]  7am-3pm    Tuesday, Wednesday  [place]  7am-3pm    E/O Friday &   [place]    Typical Surgery Days: Thursday or Friday   [facility]  [address] [place] [address] [phone] Phone  [phone] Fax    [address] [address] [phone] Phone    Imaging [phone] Phone    [facility] Labor and Delivery:  [phone] Phone     **Surgeries** Our Surgery Schedulers will contact you to schedule. If you do not receive a call within 3 business days, please call [phone].    Urgent Care locations:  [facility] Saturday and    9 am - 5 pm    Monday-Friday   12 pm - 8 pm  Saturday and    9 am - 5 pm   [phone]       If you need a medication refill, please contact your pharmacy. Please allow 3 business days for your refill to be completed.  As always, Thank you for trusting us with your healthcare needs!

## 2023-10-10 ENCOUNTER — OFFICE VISIT (OUTPATIENT)
Dept: OBGYN | Facility: CLINIC | Age: 28
End: 2023-10-10
Attending: OBSTETRICS & GYNECOLOGY
Payer: COMMERCIAL

## 2023-10-10 VITALS
BODY MASS INDEX: 35.28 KG/M2 | DIASTOLIC BLOOD PRESSURE: 83 MMHG | HEART RATE: 95 BPM | WEIGHT: 218.6 LBS | SYSTOLIC BLOOD PRESSURE: 123 MMHG

## 2023-10-10 DIAGNOSIS — N94.9 ADNEXAL CYST: Primary | ICD-10-CM

## 2023-10-10 PROCEDURE — 99214 OFFICE O/P EST MOD 30 MIN: CPT | Performed by: OBSTETRICS & GYNECOLOGY

## 2023-10-10 PROCEDURE — 86304 IMMUNOASSAY TUMOR CA 125: CPT | Performed by: OBSTETRICS & GYNECOLOGY

## 2023-10-10 PROCEDURE — 36415 COLL VENOUS BLD VENIPUNCTURE: CPT | Performed by: OBSTETRICS & GYNECOLOGY

## 2023-10-10 NOTE — PROGRESS NOTES
SUBJECTIVE:       HPI: Prachi Ross is a 28 year old  who presents today for presents today for consultation regarding abnormal imaging, referral from Pham Mcclain.    Patient seen by Pham Mcclain on  for ovarian cyst and irregular cycles. Repeat imaging was ordered, prompting referral to physician for further review.    Menses irregular; unable to quantify further.   Patient is sexually active. One male partner. She is using condoms for contraception.     No prior gyn procedures, including uterine instrumentation.          Gyn Hx: Patient's last menstrual period was 2023.     Last pap was 2022 nil   STI history - denies  Family history of gyn-related malignancies: Denies  Extensive family history of endometriosis         reports that she has never smoked. She has never used smokeless tobacco.      Today's PHQ-2 Score:       2023     2:04 PM   PHQ-2 (  [employer])   Q1: Little interest or pleasure in doing things 1   Q2: Feeling down, depressed or hopeless 1   PHQ-2 Score 2   Q1: Little interest or pleasure in doing things Several days   Q2: Feeling down, depressed or hopeless Several days   PHQ-2 Score 2     Today's PHQ-9 Score:       2022     2:40 PM   PHQ-9 SCORE   PHQ-9 Total Score MyChart 14 (Moderate depression)   PHQ-9 Total Score 14     Today's CHITO-7 Score:        No data to display                Problem list and histories reviewed & adjusted, as indicated.  Additional history: as documented.    Patient Active Problem List   Diagnosis     Encounter for surveillance of injectable contraceptive     Mild intermittent asthma with acute exacerbation     Constipation, unspecified constipation type     External hemorrhoids     BMI 35.0-35.9,adult     History reviewed. No pertinent surgical history.   Social History     Tobacco Use     Smoking status: Never     Smokeless tobacco: Never   Substance Use Topics     Alcohol use: Yes     Alcohol/week: 0.0 standard drinks of alcohol       Problem (# of Occurrences) Relation (Name,Age of Onset)    Diabetes (2) Mother, Father    Hypertension (1) Mother    Coronary Artery Disease (1) Maternal Grandmother           Negative family history of: Hyperlipidemia, Cerebrovascular Disease, Breast Cancer, Colon Cancer, Prostate Cancer, Other Cancer, Depression, Anxiety Disorder, Mental Illness, Substance Abuse, Anesthesia Reaction, Asthma, Osteoporosis, Genetic Disorder, Thyroid Disease, Obesity, Unknown/Adopted              benzonatate (TESSALON) 200 MG capsule, Take 1 capsule (200 mg) by mouth 3 times daily as needed for cough (Patient not taking: Reported on 4/5/2023)  PROAIR  (90 Base) MCG/ACT inhaler, Inhale 2 puffs into the lungs 3 times daily as needed for shortness of breath / dyspnea or wheezing (Patient not taking: Reported on 4/5/2023)    No current facility-administered medications on file prior to visit.    No Known Allergies    ROS:  10 Point review of systems negative other noted above in HPI    OBJECTIVE:     /83   Pulse 95   Wt 99.2 kg (218 lb 9.6 oz)   LMP 08/01/2023   BMI 35.28 kg/m    Body mass index is 35.28 kg/m .      Gen: Alert, oriented, appropriately interactive, NAD  Eyes: Eyes grossly normal to inspection, conjunctivae and sclerae normal  Neck: soft, no cervical adenopathy, no masses  CV: No edema  Resp: no audible wheeze, cough, or visible cyanosis.  No visible retractions or increased work of breathing.  Able to speak fully in complete sentences.  Abdomen: soft, non tender, non distended, no masses, no hernias. No inguinal lymphadenopathy.   External genitalia: no lesions; normal appearing external genitalia, bartholins glands, urethra, skenes glands  Vagina: no masses or lesions or discharge, normally rugated.  Cervix: no masses or lesions or discharge   Bimanual exam:   Nontender pelvic floor muscles  Urethra: nontender   Bladder: nontender and without massess, well supported   Uterus: midline, anteverted,  small, mobile  no masses, non-tender  Adnexa: no masses or tenderness appreciated   No cervical motion tenderness  MSK: normal gait, symmetric movements UE & LE  Lower extremities: non-tender, no edema  Neuro: Cranial nerves grossly intact, mentation intact and speech normal  Psych: mentation appears normal, affect normal/bright, judgement and insight intact, normal speech and appearance well-groomed        In-Clinic Test Results:  No results found for this or any previous visit (from the past 24 hour(s)).  Results for orders placed or performed in visit on 08/02/23   US Pelvic Transabdominal and Transvaginal    Narrative    PELVIC ULTRASOUND WITH ENDOVAGINAL TRANSDUCER    8/2/2023 2:45 PM     HISTORY: Cyst of ovary, unspecified laterality    TECHNIQUE:  Endovaginal sonography was added to the transabdominal  exam.    COMPARISON: 3/30/2023    FINDINGS:   Endometrium: Endometrium is 4 mm thick.    Uterus: 6.9 x 5.1 x 3.8 cm. Retroflexed. 1.3 x 1.5 x 1.3 cm intramural  fibroid is seen along the left wall.    Right ovary: 5.1 x 4.9 x 4.4 cm. 4.5 x 3.8 x 2.4 cm simple appearing  cyst is seen along the right ovary. 2.6 x 2.0 x 1.9 cm right adnexal  mass is again seen and demonstrates internal calcifications as well as  internal Doppler flow (previously measuring 2.0 x 1.4 x 1.5 cm).    Left ovary: 4.1 x 2.6 x 1.9 cm.    Additional findings: None.      Impression    IMPRESSION:  Enlarging right adnexal mass measuring 2.6 cm with  internal calcifications and low level internal echoes. Findings may  reflect an endometrioma but given interval growth, neoplasm is also in  the differential diagnosis. Recommend gynecological consultation.    EDITH BROWNE MD         SYSTEM ID:  [id number]    EXAMINATION: MR PELVIS (GYN) W/O & W CONTRAST, 3/30/2023 4:52 PM     COMPARISON: Pelvic ultrasound 3/27/2023     HISTORY: mass on US. Negative hcg.; Pelvic mass     TECHNIQUE: Multiplanar, multisequence imaging was obtained of the  pelvis  without and with intravenous contrast. Contrast dose: 10.0mL  Gadavist     FINDINGS:      Intrapelvic organs:  Retroflexed uterus. 1.5 cm intramural fibroid in the posterior mid  uterus. The endometrium measures approximately 6 mm in thickness.  Several small nabothian cysts.     Normal follicular architecture of the ovaries. 1.8 cm dominant  follicle. Medial to the right ovary, there is a thick-walled  unilocular lesion measuring 2.0 x 1.5 cm which demonstrates  progressive mural enhancement on the dynamic postcontrast sequences.  T1/T2 hyperintense internal debris/contents.     No abnormally dilated loops of bowel in the visualized pelvis. No  enlarged lymph nodes in the visualized portion of the pelvis. The  urinary bladder is within normal limits.     Bones and soft tissues:  No abnormal or suspicious osseous lesion. Normal marrow signal  pattern.                                                                      IMPRESSION:  1. 2.0 x 1.5 cm thick-walled unilocular cyst with internal  hemorrhagic/proteinaceous content. Differential diagnosis include  hemorrhagic paraovarian cyst versus endometriosis. Recommend follow up  ultrasound in 3-6 months to confirm stability/resolution.   2. 1.5 cm intramural fibroid in the posterior mid uterus.      Component      Latest Ref Rng 2/27/2023  3:09 PM 3/21/2023  3:16 PM   WBC      4.0 - 11.0 10e3/uL 10.5     RBC Count      3.80 - 5.20 10e6/uL 4.77     Hemoglobin      11.7 - 15.7 g/dL 14.1     Hematocrit      35.0 - 47.0 % 42.4     MCV      78 - 100 fL 89     MCH      26.5 - 33.0 pg 29.6     MCHC      31.5 - 36.5 g/dL 33.3     RDW      10.0 - 15.0 % 12.6     Platelet Count      150 - 450 10e3/uL 259     % Neutrophils      % 65     % Lymphocytes      % 25     % Monocytes      % 6     % Eosinophils      % 4     % Basophils      % 0     Absolute Neutrophils      1.6 - 8.3 10e3/uL 6.8     Absolute Lymphocytes      0.8 - 5.3 10e3/uL 2.6     Absolute Monocytes      0.0 - 1.3  10e3/uL 0.7     Absolute Eosinophils      0.0 - 0.7 10e3/uL 0.4     Absolute Basophils      0.0 - 0.2 10e3/uL 0.0     Free Testosterone Calculated      ng/dL  0.44    Testosterone Total      8 - 60 ng/dL  26    TSH      0.40 - 4.00 mU/L  2.86    Hemoglobin A1C      0.0 - 5.6 %  5.8 (H)    Estradiol      pg/mL  45    Progesterone      ng/mL  0.2    Sex Hormone Binding Globulin      30 - 135 nmol/L  36    HCG Quantitative Serum      0 - 5 IU/L       Component      Latest Ref Rn 3/29/2023  4:51 PM   WBC      4.0 - 11.0 10e3/uL    RBC Count      3.80 - 5.20 10e6/uL    Hemoglobin      11.7 - 15.7 g/dL    Hematocrit      35.0 - 47.0 %    MCV      78 - 100 fL    MCH      26.5 - 33.0 pg    MCHC      31.5 - 36.5 g/dL    RDW      10.0 - 15.0 %    Platelet Count      150 - 450 10e3/uL    % Neutrophils      %    % Lymphocytes      %    % Monocytes      %    % Eosinophils      %    % Basophils      %    Absolute Neutrophils      1.6 - 8.3 10e3/uL    Absolute Lymphocytes      0.8 - 5.3 10e3/uL    Absolute Monocytes      0.0 - 1.3 10e3/uL    Absolute Eosinophils      0.0 - 0.7 10e3/uL    Absolute Basophils      0.0 - 0.2 10e3/uL    Free Testosterone Calculated      ng/dL    Testosterone Total      8 - 60 ng/dL    TSH      0.40 - 4.00 mU/L    Hemoglobin A1C      0.0 - 5.6 %    Estradiol      pg/mL    Progesterone      ng/mL    Sex Hormone Binding Globulin      30 - 135 nmol/L    HCG Quantitative Serum      0 - 5 IU/L <1       Legend:  (H) High    ASSESSMENT/PLAN:                                                      Prachi Ross is a 28 year old  who presents today for consultation regarding abnormal imaging, referral from Pham Nguyen      ICD-10-CM    1. Adnexal cyst  N94.9      MR Pelvis (GYN) wo & w Contrast          Recent and prior imaging results reviewed. Discussed radiology interpretation and possible etiologies for cysts. Options going forward reviewed, including repeating pelvic MRI,  +/- referral  to gyn oncology. After discussing options, patient requesting ca125 and pelvic MRI. If concerning findings, will send to gyn onc. If benign, will discuss next steps over the phone.      I personally reviewed her prior pelvic ultrasound x2 and pelvic MRI, CNP notes, labs +3.    20 minutes spent on the date of the encounter doing chart review, history and exam, documentation and further activities as noted above    Lisa Richard DO  [facility]

## 2023-10-11 LAB — CANCER AG125 SERPL-ACNC: 25 U/ML

## 2023-10-19 ENCOUNTER — HOSPITAL ENCOUNTER (OUTPATIENT)
Dept: MRI IMAGING | Facility: CLINIC | Age: 28
Discharge: HOME | End: 2023-10-19
Attending: OBSTETRICS & GYNECOLOGY | Admitting: OBSTETRICS & GYNECOLOGY
Payer: COMMERCIAL

## 2023-10-19 DIAGNOSIS — N94.9 ADNEXAL CYST: ICD-10-CM

## 2023-10-19 PROCEDURE — 72197 MRI PELVIS W/O & W/DYE: CPT | Mod: 26 | Performed by: RADIOLOGY

## 2023-10-19 PROCEDURE — 72197 MRI PELVIS W/O & W/DYE: CPT

## 2023-10-19 PROCEDURE — A9585 GADOBUTROL INJECTION: HCPCS | Mod: JZ | Performed by: OBSTETRICS & GYNECOLOGY

## 2023-10-19 PROCEDURE — 255N000002 HC RX 255 OP 636: Mod: JZ | Performed by: OBSTETRICS & GYNECOLOGY

## 2023-10-19 RX ORDER — GADOBUTROL 604.72 MG/ML
9.5 INJECTION INTRAVENOUS ONCE
Status: COMPLETED | OUTPATIENT
Start: 2023-10-19 | End: 2023-10-19

## 2023-10-19 RX ADMIN — GADOBUTROL 9.5 ML: 604.72 INJECTION INTRAVENOUS at 06:41

## 2023-10-23 ENCOUNTER — VIRTUAL VISIT (OUTPATIENT)
Dept: OBGYN | Facility: CLINIC | Age: 28
End: 2023-10-23
Payer: COMMERCIAL

## 2023-10-23 DIAGNOSIS — N94.9 ADNEXAL CYST: Primary | ICD-10-CM

## 2023-10-23 DIAGNOSIS — Q51.818: ICD-10-CM

## 2023-10-23 PROCEDURE — 99213 OFFICE O/P EST LOW 20 MIN: CPT | Mod: 95 | Performed by: OBSTETRICS & GYNECOLOGY

## 2023-10-23 NOTE — PATIENT INSTRUCTIONS
If you have any questions regarding your visit, Please contact your care team.    [employer] [facility]: [phone]      Women s [facility] CLINIC HOURS TELEPHONE NUMBER   Lisa Richard DO.    IFRAH Glass-Surgery Scheduler  Jacqueline - Surgery Scheduler    DAKOTA Montano, DAKOTA Loya RN     Monday, Thursday  [place]  7am-3pm    Tuesday, Wednesday  [place]  7am-3pm    E/O Friday &   [place]    Typical Surgery Days: Thursday or Friday   [facility]  [address]  [place], [place] [address] [phone] Phone  [phone] Fax    [facility]  [address] [place] [address] [phone] Phone    Imaging [phone] Phone    [facility] Labor and Delivery:  [phone] Phone     **Surgeries** Our Surgery Schedulers will contact you to schedule. If you do not receive a call within 3 business days, please call [phone].    Urgent Care locations:  [facility] Saturday and    9 am - 5 pm    Monday-Friday   12 pm - 8 pm  Saturday and    9 am - 5 pm   [phone]       If you need a medication refill, please contact your pharmacy. Please allow 3 business days for your refill to be completed.  As always, Thank you for trusting us with your healthcare needs!

## 2023-10-23 NOTE — PROGRESS NOTES
Prachi Ross is a 28 year old female who is being evaluated via a billable telephone visit.      What phone number would you like to be contacted at? [phone]  How would you like to obtain your AVS? Lexiehart  Distant Location (provider location):  On-site  Patient Location: Home  Start Time: 231  End Time: 236      SUBJECTIVE:       HPI: Prachi Ross is a 28 year old  who presents today for presents today for VV to discuss MRI results.    No changes since last visit.      Patient seen by Pham Mcclain on  for ovarian cyst and irregular cycles. Repeat imaging was ordered, prompting referral to physician for further review.    Menses irregular; unable to quantify further.   Patient is sexually active. One male partner. She is using condoms for contraception.     No prior gyn procedures, including uterine instrumentation.          Gyn Hx: Patient's last menstrual period was 2023.     Last pap was 2022 nil   STI history - denies  Family history of gyn-related malignancies: Denies  Extensive family history of endometriosis         reports that she has never smoked. She has never used smokeless tobacco.      Today's PHQ-2 Score:       2023     2:04 PM   PHQ-2 (  [employer])   Q1: Little interest or pleasure in doing things 1   Q2: Feeling down, depressed or hopeless 1   PHQ-2 Score 2   Q1: Little interest or pleasure in doing things Several days   Q2: Feeling down, depressed or hopeless Several days   PHQ-2 Score 2     Today's PHQ-9 Score:       2022     2:40 PM   PHQ-9 SCORE   PHQ-9 Total Score [facility] 14 (Moderate depression)   PHQ-9 Total Score 14     Today's CHITO-7 Score:        No data to display                Problem list and histories reviewed & adjusted, as indicated.  Additional history: as documented.    Patient Active Problem List   Diagnosis    Encounter for surveillance of injectable contraceptive    Mild intermittent asthma with acute exacerbation    Constipation,  unspecified constipation type    External hemorrhoids    BMI 35.0-35.9,adult     History reviewed. No pertinent surgical history.   Social History     Tobacco Use    Smoking status: Never    Smokeless tobacco: Never   Substance Use Topics    Alcohol use: Yes     Alcohol/week: 0.0 standard drinks of alcohol      Problem (# of Occurrences) Relation (Name,Age of Onset)    Diabetes (2) Mother, Father    Hypertension (1) Mother    Coronary Artery Disease (1) Maternal Grandmother           Negative family history of: Hyperlipidemia, Cerebrovascular Disease, Breast Cancer, Colon Cancer, Prostate Cancer, Other Cancer, Depression, Anxiety Disorder, Mental Illness, Substance Abuse, Anesthesia Reaction, Asthma, Osteoporosis, Genetic Disorder, Thyroid Disease, Obesity, Unknown/Adopted              benzonatate (TESSALON) 200 MG capsule, Take 1 capsule (200 mg) by mouth 3 times daily as needed for cough (Patient not taking: Reported on 4/5/2023)  PROAIR  (90 Base) MCG/ACT inhaler, Inhale 2 puffs into the lungs 3 times daily as needed for shortness of breath / dyspnea or wheezing (Patient not taking: Reported on 4/5/2023)    No current facility-administered medications on file prior to visit.    No Known Allergies    ROS:  10 Point review of systems negative other noted above in HPI    OBJECTIVE:     LMP 08/01/2023   There is no height or weight on file to calculate BMI.      Gen: Alert, oriented, appropriately interactive, NAD          In-Clinic Test Results:  No results found for this or any previous visit (from the past 24 hour(s)).  Results for orders placed or performed in visit on 08/02/23   US Pelvic Transabdominal and Transvaginal    Narrative    PELVIC ULTRASOUND WITH ENDOVAGINAL TRANSDUCER    8/2/2023 2:45 PM     HISTORY: Cyst of ovary, unspecified laterality    TECHNIQUE:  Endovaginal sonography was added to the transabdominal  exam.    COMPARISON: 3/30/2023    FINDINGS:   Endometrium: Endometrium is 4 mm  thick.    Uterus: 6.9 x 5.1 x 3.8 cm. Retroflexed. 1.3 x 1.5 x 1.3 cm intramural  fibroid is seen along the left wall.    Right ovary: 5.1 x 4.9 x 4.4 cm. 4.5 x 3.8 x 2.4 cm simple appearing  cyst is seen along the right ovary. 2.6 x 2.0 x 1.9 cm right adnexal  mass is again seen and demonstrates internal calcifications as well as  internal Doppler flow (previously measuring 2.0 x 1.4 x 1.5 cm).    Left ovary: 4.1 x 2.6 x 1.9 cm.    Additional findings: None.      Impression    IMPRESSION:  Enlarging right adnexal mass measuring 2.6 cm with  internal calcifications and low level internal echoes. Findings may  reflect an endometrioma but given interval growth, neoplasm is also in  the differential diagnosis. Recommend gynecological consultation.    EDITH BROWNE MD         SYSTEM ID:  [id number]    EXAMINATION: MR PELVIS (GYN) W/O & W CONTRAST, 3/30/2023 4:52 PM     COMPARISON: Pelvic ultrasound 3/27/2023     HISTORY: mass on US. Negative hcg.; Pelvic mass     TECHNIQUE: Multiplanar, multisequence imaging was obtained of the  pelvis without and with intravenous contrast. Contrast dose: 10.0mL  Gadavist     FINDINGS:      Intrapelvic organs:  Retroflexed uterus. 1.5 cm intramural fibroid in the posterior mid  uterus. The endometrium measures approximately 6 mm in thickness.  Several small nabothian cysts.     Normal follicular architecture of the ovaries. 1.8 cm dominant  follicle. Medial to the right ovary, there is a thick-walled  unilocular lesion measuring 2.0 x 1.5 cm which demonstrates  progressive mural enhancement on the dynamic postcontrast sequences.  T1/T2 hyperintense internal debris/contents.     No abnormally dilated loops of bowel in the visualized pelvis. No  enlarged lymph nodes in the visualized portion of the pelvis. The  urinary bladder is within normal limits.     Bones and soft tissues:  No abnormal or suspicious osseous lesion. Normal marrow signal  pattern.                                                                       IMPRESSION:  1. 2.0 x 1.5 cm thick-walled unilocular cyst with internal  hemorrhagic/proteinaceous content. Differential diagnosis include  hemorrhagic paraovarian cyst versus endometriosis. Recommend follow up  ultrasound in 3-6 months to confirm stability/resolution.   2. 1.5 cm intramural fibroid in the posterior mid uterus.      Component      Latest Ref Rn 2/27/2023  3:09 PM 3/21/2023  3:16 PM   WBC      4.0 - 11.0 10e3/uL 10.5     RBC Count      3.80 - 5.20 10e6/uL 4.77     Hemoglobin      11.7 - 15.7 g/dL 14.1     Hematocrit      35.0 - 47.0 % 42.4     MCV      78 - 100 fL 89     MCH      26.5 - 33.0 pg 29.6     MCHC      31.5 - 36.5 g/dL 33.3     RDW      10.0 - 15.0 % 12.6     Platelet Count      150 - 450 10e3/uL 259     % Neutrophils      % 65     % Lymphocytes      % 25     % Monocytes      % 6     % Eosinophils      % 4     % Basophils      % 0     Absolute Neutrophils      1.6 - 8.3 10e3/uL 6.8     Absolute Lymphocytes      0.8 - 5.3 10e3/uL 2.6     Absolute Monocytes      0.0 - 1.3 10e3/uL 0.7     Absolute Eosinophils      0.0 - 0.7 10e3/uL 0.4     Absolute Basophils      0.0 - 0.2 10e3/uL 0.0     Free Testosterone Calculated      ng/dL  0.44    Testosterone Total      8 - 60 ng/dL  26    TSH      0.40 - 4.00 mU/L  2.86    Hemoglobin A1C      0.0 - 5.6 %  5.8 (H)    Estradiol      pg/mL  45    Progesterone      ng/mL  0.2    Sex Hormone Binding Globulin      30 - 135 nmol/L  36    HCG Quantitative Serum      0 - 5 IU/L       Component      Latest Ref Rn 3/29/2023  4:51 PM   WBC      4.0 - 11.0 10e3/uL    RBC Count      3.80 - 5.20 10e6/uL    Hemoglobin      11.7 - 15.7 g/dL    Hematocrit      35.0 - 47.0 %    MCV      78 - 100 fL    MCH      26.5 - 33.0 pg    MCHC      31.5 - 36.5 g/dL    RDW      10.0 - 15.0 %    Platelet Count      150 - 450 10e3/uL    % Neutrophils      %    % Lymphocytes      %    % Monocytes      %    % Eosinophils      %    % Basophils      %     Absolute Neutrophils      1.6 - 8.3 10e3/uL    Absolute Lymphocytes      0.8 - 5.3 10e3/uL    Absolute Monocytes      0.0 - 1.3 10e3/uL    Absolute Eosinophils      0.0 - 0.7 10e3/uL    Absolute Basophils      0.0 - 0.2 10e3/uL    Free Testosterone Calculated      ng/dL    Testosterone Total      8 - 60 ng/dL    TSH      0.40 - 4.00 mU/L    Hemoglobin A1C      0.0 - 5.6 %    Estradiol      pg/mL    Progesterone      ng/mL    Sex Hormone Binding Globulin      30 - 135 nmol/L    HCG Quantitative Serum      0 - 5 IU/L <1       Legend:  (H) High     25    Results for orders placed or performed during the hospital encounter of 10/19/23   MR Pelvis (GYN) wo & w Contrast    Narrative    EXAMINATION: MR PELVIS (GYN) W/O & W CONTRAST, 10/19/2023 7:58  AM    COMPARISON: Pelvic ultrasound 3/27/2023    HISTORY: ovarian cyst; Adnexal cyst    TECHNIQUE: Multiplanar, multisequence imaging was obtained of the  pelvis without and with intravenous contrast. Contrast dose: 9.5mL of  Keenan    FINDINGS:     Intrapelvic organs:  Retroflexed uterus. 1.3 cm intramural fibroid in the posterior mid  uterus. The endometrium measures approximately 8 mm in thickness.  Several small nabothian cysts.    Normal follicular architecture of the right ovary. Redemonstrated  thick-walled oval structure medial to the right ovary and inseparable  from the uterus measuring 2.2 x 1.7 cm, with central linear T1 and T2  hyperintensity (series 4, image 41 and series 3, image 22).    Left ovarian 4.4 x 4.6 cm simple fluid cyst without enhancing/solid  components or septations. There is normal-appearing left ovarian  parenchyma splayed at its periphery.    No abnormally dilated loops of bowel in the visualized pelvis. No  enlarged lymph nodes in the visualized portion of the pelvis. The  urinary bladder is within normal limits.    Bones and soft tissues:  No abnormal or suspicious osseous lesion. Normal marrow signal  pattern.      Impression     IMPRESSION:  1. Stable thick-walled oval structure medial to the right ovary and  inseparable from the uterus, measuring 2.2 x 1.7 cm, is of uncertain  etiology/origin. In addition to paraovarian cyst/endometriotic cyst,  differential diagnosis would include a noncommunicating rudimentary  uterine horn, given the central linear T1 and T2 hyperintensity which  is similar in appearance to the endometrium. Recommend follow up in  6-12 months.  2. Unchanged 1.5 cm intramural fibroid in the posterior mid uterus.  3. Unilocular left ovarian cyst is benign and no follow-up is  required.    I have personally reviewed the examination and initial interpretation  and I agree with the findings.    DELMIS AUSTIN MD         SYSTEM ID:  [id number]        ASSESSMENT/PLAN:                                                      Prachi Ross is a 28 year old  who presents today for consultation regarding abnormal imaging, referral from Pham Nguyen      ICD-10-CM    1. 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  low at 25. No concerning features on MRI for malignancy- endometrioma vs rudimentary uterine horn. Recommend repeat imaging in 6-12 months. Patient not interested in surgical interventions at this time. MRI order placed. All questions answered.    Lisa Richard DO  [facility]

## 2023-11-13 ENCOUNTER — OFFICE VISIT (OUTPATIENT)
Dept: URGENT CARE | Facility: URGENT CARE | Age: 28
End: 2023-11-13
Payer: COMMERCIAL

## 2023-11-13 VITALS
BODY MASS INDEX: 34.86 KG/M2 | SYSTOLIC BLOOD PRESSURE: 123 MMHG | TEMPERATURE: 101.4 F | DIASTOLIC BLOOD PRESSURE: 88 MMHG | WEIGHT: 216 LBS | RESPIRATION RATE: 22 BRPM | OXYGEN SATURATION: 96 % | HEART RATE: 114 BPM

## 2023-11-13 DIAGNOSIS — R53.81 MALAISE: ICD-10-CM

## 2023-11-13 DIAGNOSIS — J45.21 MILD INTERMITTENT ASTHMA WITH ACUTE EXACERBATION: ICD-10-CM

## 2023-11-13 DIAGNOSIS — J06.9 VIRAL URI: Primary | ICD-10-CM

## 2023-11-13 DIAGNOSIS — R50.9 FEVER IN ADULT: ICD-10-CM

## 2023-11-13 LAB
FLUAV AG SPEC QL IA: NEGATIVE
FLUBV AG SPEC QL IA: NEGATIVE

## 2023-11-13 PROCEDURE — 99213 OFFICE O/P EST LOW 20 MIN: CPT | Performed by: NURSE PRACTITIONER

## 2023-11-13 PROCEDURE — 87804 INFLUENZA ASSAY W/OPTIC: CPT | Performed by: NURSE PRACTITIONER

## 2023-11-13 PROCEDURE — 87635 SARS-COV-2 COVID-19 AMP PRB: CPT | Performed by: NURSE PRACTITIONER

## 2023-11-13 NOTE — PATIENT INSTRUCTIONS
Discharge Instructions for COVID-19 Patients  You were tested for COVID-19. Your result was positive. This means you do have COVID-19. Follow the steps below. If you were tested for an upcoming treatment (procedure), you should also contact your care team for next steps.  How can I take care of myself at home?  Get lots of rest.  Drink extra fluids (unless a doctor has told you not to).  Take acetaminophen (Tylenol) for fever or pain. If you have liver or kidney problems, first ask your care team if it's safe to take acetaminophen.  Adults can take either:  650 mg (two 325 mg pills) every 4 to 6 hours as needed (but no more than 10 pills per day), OR   1,000 mg (two 500 mg pills) every 6 hours as needed (but no more than 6 pills per day).  Note: Don't take more than 3,000 mg of acetaminophen (Tylenol) in one day. Acetaminophen is found in many medicines, even over-the-counter medicines. Read all labels to be sure you don't take too much.  For children:  Check the acetaminophen (Tylenol) bottle to find out the right dose based on their age or weight.  Don't give children more than 1,625 mg of Tylenol in one day.  Know when to call 911. Emergency warning signs include:  Trouble breathing or shortness of breath  Pain or pressure in the chest that doesn't go away  Feeling confused like you haven't felt before, or not being able to wake up  Bluish-colored lips or face  If you have other health problems (like cancer, heart failure, an organ transplant, or severe kidney disease): Call your specialty clinic if you don't feel better in the next 2 days.  How can I protect others?  If you DO have symptoms:  Stay home and away from others (self-isolate):  For at least 5 days after your symptoms started, AND UNTIL  You've had no fever for 24 hours--without taking any medicine that reduces fever, AND  Your other symptoms (such as a cough) are better.  Wear a mask or face covering for 10 full days anytime you're around other  people.  If you DON'T have symptoms:  Stay at home and away from others for   at least 5 days after your positive test.  Wear a mask or face covering for 10 full days anytime you're around other people.  If you plan to visit a clinic or hospital, please check their guidelines before you arrive--healthcare sites may have different rules. If you were tested because you're going to have surgery or another treatment, contact your care team for next steps.  If you were very ill from COVID or have a weak immune system: Stay at home and away from others for at least 10 days. Ask your doctor about other actions you should take.   During self-isolation  Stay home until it's safe to be around others.  At home, stay away from other people and pets. Or, wear a well-fitting mask when you need to be around others.  Monitor your symptoms. If you have any emergency warning signs listed at the link (such as trouble breathing, chest pain that won't go away, or confusion that's new to you), then get emergency medical care right away.  Stay in a separate room from other household members, if possible.  Use a separate bathroom, if possible.  Improve ventilation (air flow) at home, if possible.  Don't share personal household items, like cups, towels, and utensils.   Is there medicine to treat COVID-19?  Yes, there are safe and effective medicines. They may make you feel better faster, keep you out of the hospital, and prevent death.   It's very important to take these medicines early in your illness before you get worse.   Who should take this medicine?   These treatments are for people who are not in the hospital, but who are at risk of getting very sick from COVID. This includes people who:  Are over age 65  Are members of the [place] community (Black, indigenous, and people of color)  Are overweight (body mass index is over 25)  Are inactive (don't exercise)  Are pregnant  Smoke or vape (now or in the past)  Have a disability  Have any  "of the following health problems: diabetes, high blood pressure, cancer, heart problems, liver disease, lung disease, kidney disease, sickle cell disease, cystic fibrosis (CF), dementia and other neuro (brain) diseases, HIV, thalassemia, or tuberculosis (TB)  Have ever had a stroke, organ transplant, or blood cell transplant  Have a mental health problem or substance abuse disorder (drugs, alcohol)  Have a weak immune system (are immuno-compromised)  COVID medicines can affect the safety of other medicines you take. It's important to talk to your care team before you take any new medicines. Sometimes you'll need to make short-term changes to your other medicines.  What should I do if I have symptoms now and want to discuss treatments?  Call your family clinic. Or, dial [phone] ([phone]) and say \"COVID\" when prompted, OR  Go to [email or web address] (click \"Message Your Care Team\"), OR  Request an appointment on [employer]  When can I go back to work?  You should NOT go back to work until you meet the guidelines on page 1. (See the \"How can I protect others?\" section.) You don't need to be re-tested for COVID before going back to work. Studies show that you won't spread the virus if it's been at least 10 days since your symptoms started (or 20 days if you have a weak immune system).  Employers, schools, and daycares: This document serves as formal notice of medical guidelines before your employee or student can return to work or school. They must meet the guidelines in the \"How can I protect others?\" section on page 1 before going back in person.   Where can I get more information?   [employer] - About COVID-19:   [email or web address]  CDC - If You Are Sick or Caring for Someone:   www.cdc.gov/coronavirus/2019-ncov/if-you-are-sick/index.html  CDC - Isolation and Precautions for People with COVID-19:   www.cdc.gov/coronavirus/2019-ncov/your-health/isolation.html  [facility] " Clinical trials (COVID-19 research studies):   clinicalaffairs.[facility].edu/covid-19-updates/n-clinical-trials    For informational purposes only. Not to replace the advice of your health care provider. Clinically reviewed by Dr. Marvin Pelaez. Copyright   2020 [employer] [facility]. All rights reserved. ki work [id number] - REV 09/23.

## 2023-11-13 NOTE — PROGRESS NOTES
Assessment & Plan     1. Viral URI      2. Fever in adult    - Symptomatic COVID-19 Virus (Coronavirus) by PCR Nose  - Influenza A & B Antigen    3. Malaise      4. Mild intermittent asthma with acute exacerbation    Asthma stable denied wheezing or cough at this time.  We discussed if symptoms were to exacerbate her asthma worsened we will follow-up in urgent care or ER if indicated.  No indication for chest x-ray or imaging today.  Influenza negative pending COVID test.  We discussed CDC recommendations for self-isolation and letter was given for work.  Offered ibuprofen in clinic today as patient had recently taken Tylenol patient declined states she has at home and will take care.  We will continue to monitor symptoms of fever closely.    If symptoms or not improving in 1 week we will follow-up with her primary care we discussed red flags that would warrant emergent or urgent evaluation patient verbalized understanding was agreement this plan    BRITTANY Colvin CNP  [facility] [place]    Subjective     Prachi is a 28 year old female who presents to clinic today for the following health issues:  Chief Complaint   Patient presents with    Urgent Care     Present for fever, chills, congestion, runny nose, cough and diarrhea since yesterday.   (Declined strep test).     Letter for School/Work     Request work note.      HPI      URI Adult    Onset of symptoms was 1 day(s) ago.  Course of illness is worsening.    Severity moderate  Current and Associated symptoms: fever, chills, sweats, headache, body aches, and fatigue  Treatment measures tried include Tylenol/Ibuprofen.  Predisposing factors include ill contact: Family member .      Review of Systems  Constitutional, HEENT, cardiovascular, pulmonary, gi and gu systems are negative, except as otherwise noted.      Objective    /88   Pulse 114   Temp (!) 101.4  F (38.6  C) (Tympanic)   Resp 22   Wt 98 kg (216 lb)   LMP 08/01/2023    SpO2 96%   BMI 34.86 kg/m    Physical Exam   GENERAL: no distress, over weight, and fatigued  EYES: Eyes grossly normal to inspection, PERRL and conjunctivae and sclerae normal  HENT: normal cephalic/atraumatic, ear canals and TM's normal, nose and mouth without ulcers or lesions, oropharynx clear, and oral mucous membranes moist  NECK: no adenopathy, no asymmetry, masses, or scars and thyroid normal to palpation  RESP: lungs clear to auscultation - no rales, rhonchi or wheezes  CV: regular rate and rhythm, normal S1 S2, no S3 or S4, no murmur, click or rub, no peripheral edema and peripheral pulses strong  ABDOMEN: soft, nontender, no hepatosplenomegaly, no masses and bowel sounds normal  MS: no gross musculoskeletal defects noted, no edema  SKIN: no suspicious lesions or rashes    Results for orders placed or performed in visit on 11/13/23   Influenza A & B Antigen     Status: Normal    Specimen: Nose; Swab   Result Value Ref Range    Influenza A antigen Negative Negative    Influenza B antigen Negative Negative    Narrative    Test results must be correlated with clinical data. If necessary, results should be confirmed by a molecular assay or viral culture.

## 2023-11-13 NOTE — LETTER
November 13, 2023      Prachi Cornellemiliano  [address] [place] [facility] [address]        To Whom It May Concern:    Prachi Ross  was seen on 11/13/2023.  Please excuse her from work until 3-5 days due to illness.        Sincerely,        BRITTANY Colvin CNP

## 2023-11-14 ENCOUNTER — TELEPHONE (OUTPATIENT)
Dept: NURSING | Facility: CLINIC | Age: 28
End: 2023-11-14
Payer: COMMERCIAL

## 2023-11-14 LAB — SARS-COV-2 RNA RESP QL NAA+PROBE: POSITIVE

## 2023-11-14 NOTE — TELEPHONE ENCOUNTER
Patient classified as COVID treatment eligible by Epic high risk algorithm:  No    Coronavirus (COVID-19) Notification    Reason for call  Notify of POSITIVE COVID-19 lab result, assess symptoms,  review [facility] recommendations    Lab Result   Lab test for 2019-nCoV rRt-PCR or SARS-COV-2 PCR  Oropharyngeal AND/OR nasopharyngeal swabs were POSITIVE for 2019-nCoV RNA [OR] SARS-COV-2 RNA (COVID-19) RNA     We have been unable to reach patient by phone at this time to notify of their Positive COVID-19 result.    Left voicemail message requesting a call back to [phone] [facility] for results.        A Positive COVID-19 letter will be sent via [employer] or the mail.    ALHAJI MCFADDEN

## 2024-07-13 ENCOUNTER — HEALTH MAINTENANCE LETTER (OUTPATIENT)
Age: 29
End: 2024-07-13

## 2025-01-13 ENCOUNTER — OFFICE VISIT (OUTPATIENT)
Dept: FAMILY MEDICINE | Facility: CLINIC | Age: 30
End: 2025-01-13
Payer: COMMERCIAL

## 2025-01-13 VITALS
HEART RATE: 90 BPM | RESPIRATION RATE: 20 BRPM | HEIGHT: 67 IN | TEMPERATURE: 98.3 F | DIASTOLIC BLOOD PRESSURE: 88 MMHG | BODY MASS INDEX: 35.16 KG/M2 | WEIGHT: 224 LBS | OXYGEN SATURATION: 98 % | SYSTOLIC BLOOD PRESSURE: 136 MMHG

## 2025-01-13 DIAGNOSIS — R10.2 VAGINAL PAIN: ICD-10-CM

## 2025-01-13 DIAGNOSIS — Z13.220 LIPID SCREENING: ICD-10-CM

## 2025-01-13 DIAGNOSIS — R00.2 PALPITATIONS: ICD-10-CM

## 2025-01-13 DIAGNOSIS — Z00.00 ROUTINE GENERAL MEDICAL EXAMINATION AT A HEALTH CARE FACILITY: Primary | ICD-10-CM

## 2025-01-13 DIAGNOSIS — Z13.1 SCREENING FOR DIABETES MELLITUS: ICD-10-CM

## 2025-01-13 DIAGNOSIS — Z11.59 NEED FOR HEPATITIS C SCREENING TEST: ICD-10-CM

## 2025-01-13 DIAGNOSIS — E66.9 OBESITY (BMI 35.0-39.9 WITHOUT COMORBIDITY): ICD-10-CM

## 2025-01-13 DIAGNOSIS — F52.6 SEXUAL PAIN DISORDER: ICD-10-CM

## 2025-01-13 DIAGNOSIS — Z11.4 SCREENING FOR HIV (HUMAN IMMUNODEFICIENCY VIRUS): ICD-10-CM

## 2025-01-13 LAB
CLUE CELLS: ABNORMAL
TRICHOMONAS, WET PREP: ABNORMAL
WBC'S/HIGH POWER FIELD, WET PREP: ABNORMAL
YEAST, WET PREP: ABNORMAL

## 2025-01-13 PROCEDURE — 93242 EXT ECG>48HR<7D RECORDING: CPT | Performed by: PHYSICIAN ASSISTANT

## 2025-01-13 PROCEDURE — 87210 SMEAR WET MOUNT SALINE/INK: CPT | Performed by: PHYSICIAN ASSISTANT

## 2025-01-13 PROCEDURE — 99395 PREV VISIT EST AGE 18-39: CPT | Performed by: PHYSICIAN ASSISTANT

## 2025-01-13 PROCEDURE — 99213 OFFICE O/P EST LOW 20 MIN: CPT | Mod: 25 | Performed by: PHYSICIAN ASSISTANT

## 2025-01-13 SDOH — HEALTH STABILITY: PHYSICAL HEALTH: ON AVERAGE, HOW MANY DAYS PER WEEK DO YOU ENGAGE IN MODERATE TO STRENUOUS EXERCISE (LIKE A BRISK WALK)?: 4 DAYS

## 2025-01-13 SDOH — HEALTH STABILITY: PHYSICAL HEALTH: ON AVERAGE, HOW MANY MINUTES DO YOU ENGAGE IN EXERCISE AT THIS LEVEL?: 40 MIN

## 2025-01-13 ASSESSMENT — ASTHMA QUESTIONNAIRES
QUESTION_3 LAST FOUR WEEKS HOW OFTEN DID YOUR ASTHMA SYMPTOMS (WHEEZING, COUGHING, SHORTNESS OF BREATH, CHEST TIGHTNESS OR PAIN) WAKE YOU UP AT NIGHT OR EARLIER THAN USUAL IN THE MORNING: NOT AT ALL
QUESTION_2 LAST FOUR WEEKS HOW OFTEN HAVE YOU HAD SHORTNESS OF BREATH: NOT AT ALL
ACT_TOTALSCORE: 24
QUESTION_4 LAST FOUR WEEKS HOW OFTEN HAVE YOU USED YOUR RESCUE INHALER OR NEBULIZER MEDICATION (SUCH AS ALBUTEROL): NOT AT ALL
QUESTION_1 LAST FOUR WEEKS HOW MUCH OF THE TIME DID YOUR ASTHMA KEEP YOU FROM GETTING AS MUCH DONE AT WORK, SCHOOL OR AT HOME: NONE OF THE TIME
QUESTION_5 LAST FOUR WEEKS HOW WOULD YOU RATE YOUR ASTHMA CONTROL: WELL CONTROLLED
ACT_TOTALSCORE: 24

## 2025-01-13 ASSESSMENT — ENCOUNTER SYMPTOMS
VOMITING: 0
NAUSEA: 0
EYE DISCHARGE: 0
EYE PAIN: 0
DOUBLE VISION: 0
SHORTNESS OF BREATH: 0
FEVER: 0
EYE REDNESS: 0
SORE THROAT: 0
BLURRED VISION: 0
PHOTOPHOBIA: 0

## 2025-01-13 ASSESSMENT — SOCIAL DETERMINANTS OF HEALTH (SDOH): HOW OFTEN DO YOU GET TOGETHER WITH FRIENDS OR RELATIVES?: ONCE A WEEK

## 2025-01-13 ASSESSMENT — PAIN SCALES - GENERAL: PAINLEVEL_OUTOF10: NO PAIN (0)

## 2025-01-13 NOTE — PROGRESS NOTES
"Preventive Care Visit  [facility]  ROWAN MALHOTRA PA-C, Physician Assistant - Medical  Jan 13, 2025      Assessment & Plan     Routine general medical examination at a health care facility  PAP due next year  Declines immunizations  Declines STD check    F/U annually for wellness exam and in the interim as needed for problem visits.    Palpitations  - ZIO PATCH 3-7 DAYS (additional cost to patient)  - ZIO PATCH 3-7 DAYS APPLICATION  - TSH with free T4 reflex; Future  - CBC with platelets and differential; Future    Vaginal pain  - Ob/Gyn  Referral; Future  - Wet prep - lab collect    Sexual pain disorder  - Ob/Gyn  Referral; Future  - Wet prep - lab collect    Lipid screening  - Lipid panel reflex to direct LDL Fasting; Future    Screening for diabetes mellitus  - Hemoglobin A1c; Future    Obesity BMI 35, no comorbidity  Work on diet and exercise. Consider Weight Loss management referral if she wishes.     See results documentation for follow up of this visit.     BMI  Estimated body mass index is 35.61 kg/m  as calculated from the following:    Height as of this encounter: 1.689 m (5' 6.5\").    Weight as of this encounter: 101.6 kg (224 lb).       Counseling  Appropriate preventive services were addressed with this patient via screening, questionnaire, or discussion as appropriate for fall prevention, nutrition, physical activity, Tobacco-use cessation, social engagement, weight loss and cognition.  Checklist reviewing preventive services available has been given to the patient.  Reviewed patient's diet, addressing concerns and/or questions.   The patient was instructed to see the dentist every 6 months.       Nevin Velarde is a 29 year old, presenting for the following:  Physical        1/13/2025     3:39 PM   Additional Questions   Roomed by jesus   Accompanied by self         1/13/2025     3:39 PM   Patient Reported Additional Medications   Patient reports " "taking the following new medications see chart            Well Women Physical Exam:    Date of last exam: two years ago   Dental: \"barely once a year\"   Eyes: \"has been awhile\"     LMP:    Fertility history: G 0 P 0   Birth Control: no birth control - she and her fiance have decided they never want children   STD Screening: not interested     PAP/Cervical Cancer Screenin2022   Mammogram: N/A - no breast concerns   Colon Cancer Screening: N/A   DXA: N/A   Immunizations: declined   Family history of breast, ovarian or colon cancer: none     Smoker: no   Interested in Smoking Cessation: N/A  Alcohol Use: none   Sleep: \"fine\"     Diet: Regular - working on increasing fruits/vegetables    Exercise: 4x/week - cardio/weight lifting    Supplements: [employer] Gummy    Cholesterol Screening: ordered to be completed when fasting    Diabetes Screening: ordered to be completed when fasting      Depression and Anxiety Screening: mental health concerns are self-managed. She has been informed to schedule a visit if she is having concerns.     Other Concerns:      Cervical pain that began in the end of 2018/beginning of early 2019. She describes that she is having both \"jolts\" near her ovaries that occur on both sides and insertional pain. They both began around the same time. The jolts last up to 30 seconds and occur multiple times per week. She has previously worked with OB/GYN who ordered an MRI for her. She has been off birth control since 2018 and is not experiencing regular periods. When the pain gets too much, she takes ibuprofen which is effective.     Heart palpitations since 7th grade that previously were unconcerning as they were infrequent but over the past 1.5 years, they have become more frequent and bothersome. She gets short of breath when this happens. A few weeks ago, she put her brother's apple watch on when she was having palpitations and states it showed afib. She describes the palpitations as a " skipped beat and then racing heart. She has been decreasing her caffeine, does not smoke and rarely drinks alcohol. There is a family history of thyroid issues and she is concerned whether that could be the cause of her symptoms. Palpitations occur at random points in time, multiple times a week.     Intermittent chest pain that occurs on and off over the past months/years. She describes it as a pressure in the center of her chest. It lasts anywhere from a few minutes to five hours. The chest pain is not related to her heart palpations. She does not have GERD. This is not associated with certain movement, positions or exercise.     Right ear pressure that comes and goes.       Review of Systems   Constitutional:  Negative for fever.   HENT:  Positive for tinnitus. Negative for ear discharge and sore throat.         Ear pressure on right    Eyes:  Negative for blurred vision, double vision, photophobia, pain, discharge and redness.   Respiratory:  Negative for shortness of breath.    Cardiovascular:  Negative for chest pain.   Gastrointestinal:  Negative for nausea and vomiting.   Genitourinary: Negative.    Skin:  Negative for rash.       Health Care Directive  Patient does not have a Health Care Directive: Discussed advance care planning with patient; however, patient declined at this time.      1/13/2025   General Health   How would you rate your overall physical health? (!) FAIR   Feel stress (tense, anxious, or unable to sleep) Only a little   (!) STRESS CONCERN      1/13/2025   Nutrition   Three or more servings of calcium each day? (!) NO   Diet: Regular (no restrictions)   How many servings of fruit and vegetables per day? (!) 0-1   How many sweetened beverages each day? (!) 2         1/13/2025   Exercise   Days per week of moderate/strenous exercise 4 days   Average minutes spent exercising at this level 40 min         1/13/2025   Social Factors   Frequency of gathering with friends or relatives Once a week    Worry food won't last until get money to buy more No   Food not last or not have enough money for food? No   Do you have housing? (Housing is defined as stable permanent housing and does not include staying ouside in a car, in a tent, in an abandoned building, in an overnight shelter, or couch-surfing.) Yes   Are you worried about losing your housing? No   Lack of transportation? No   Unable to get utilities (heat,electricity)? No         1/13/2025   Dental   Dentist two times every year? (!) NO         1/13/2025   TB Screening   Were you born outside of the [place]? No         Today's PHQ-2 Score:       1/13/2025     3:34 PM   PHQ-2 ( 1999 [employer])   Q1: Little interest or pleasure in doing things 0   Q2: Feeling down, depressed or hopeless 1   PHQ-2 Score 1    Q1: Little interest or pleasure in doing things Not at all   Q2: Feeling down, depressed or hopeless Several days   PHQ-2 Score 1       Patient-reported           1/13/2025   Substance Use   Alcohol more than 3/day or more than 7/wk No   Do you use any other substances recreationally? No     Social History     Tobacco Use    Smoking status: Never     Passive exposure: Never    Smokeless tobacco: Never   Vaping Use    Vaping status: Never Used   Substance Use Topics    Alcohol use: Yes     Alcohol/week: 0.0 standard drinks of alcohol    Drug use: No          Mammogram Screening - Patient under 40 years of age: Routine Mammogram Screening not recommended.           1/13/2025   One time HIV Screening   Previous HIV test? Decline         1/13/2025   STI Screening   New sexual partner(s) since last STI/HIV test? (!) DECLINE     History of abnormal Pap smear: No - age 21-29 PAP every 3 years recommended        11/1/2022     2:51 PM 3/24/2017     8:33 AM   PAP / HPV   PAP Negative for Intraepithelial Lesion or Malignancy (NILM)     PAP (Historical)  NIL            1/13/2025   Contraception/Family Planning   Questions about contraception or family planning No      Reviewed and updated as needed this visit by Provider      Past Medical History:   Diagnosis Date    Raynaud disease      No past surgical history on file.  OB History    Para Term  AB Living   0 0 0 0 0 0   SAB IAB Ectopic Multiple Live Births   0 0 0 0 0     BP Readings from Last 3 Encounters:   25 136/88   23 123/88   10/10/23 123/83    Wt Readings from Last 3 Encounters:   25 101.6 kg (224 lb)   23 98 kg (216 lb)   10/10/23 99.2 kg (218 lb 9.6 oz)                  Patient Active Problem List   Diagnosis    Encounter for surveillance of injectable contraceptive    Mild intermittent asthma with acute exacerbation    Constipation, unspecified constipation type    External hemorrhoids    BMI 35.0-35.9,adult    Raynaud phenomenon     No past surgical history on file.    Social History     Tobacco Use    Smoking status: Never     Passive exposure: Never    Smokeless tobacco: Never   Substance Use Topics    Alcohol use: Yes     Alcohol/week: 0.0 standard drinks of alcohol     Family History   Problem Relation Age of Onset    Hypertension Mother     Diabetes Mother     Diabetes Father     Coronary Artery Disease Maternal Grandmother     Hyperlipidemia No family hx of     Cerebrovascular Disease No family hx of     Breast Cancer No family hx of     Colon Cancer No family hx of     Prostate Cancer No family hx of     Other Cancer No family hx of     Depression No family hx of     Anxiety Disorder No family hx of     Mental Illness No family hx of     Substance Abuse No family hx of     Anesthesia Reaction No family hx of     Asthma No family hx of     Osteoporosis No family hx of     Genetic Disorder No family hx of     Thyroid Disease No family hx of     Obesity No family hx of     Unknown/Adopted No family hx of          Current Outpatient Medications   Medication Sig Dispense Refill    PROAIR  (90 Base) MCG/ACT inhaler Inhale 2 puffs into the lungs 3 times daily as  "needed for shortness of breath / dyspnea or wheezing 36 g 1     No Known Allergies  Recent Labs   Lab Test 03/21/23  1516 11/01/22  1511 03/24/17  0846   A1C 5.8* 5.6  --    LDL  --  183* 118*   HDL  --  45* 41*   TRIG  --  194* 136   CR  --  0.75  --    GFRESTIMATED  --  >90  --    POTASSIUM  --  4.5  --    TSH 2.86 3.07  --          Objective    Exam  /88   Pulse 90   Temp 98.3  F (36.8  C) (Tympanic)   Resp 20   Ht 1.689 m (5' 6.5\")   Wt 101.6 kg (224 lb)   LMP 01/09/2025 (Exact Date)   SpO2 98%   BMI 35.61 kg/m     Estimated body mass index is 35.61 kg/m  as calculated from the following:    Height as of this encounter: 1.689 m (5' 6.5\").    Weight as of this encounter: 101.6 kg (224 lb).    Physical Exam  Constitutional:       General: She is not in acute distress.     Appearance: She is not diaphoretic.   HENT:      Head: Normocephalic and atraumatic.      Right Ear: Ear canal and external ear normal. There is no impacted cerumen. Tympanic membrane is scarred.      Left Ear: Tympanic membrane, ear canal and external ear normal. There is no impacted cerumen.      Mouth/Throat:      Mouth: Mucous membranes are moist.      Pharynx: Oropharynx is clear. No oropharyngeal exudate or posterior oropharyngeal erythema.   Eyes:      General: No scleral icterus.        Right eye: No discharge.         Left eye: No discharge.      Extraocular Movements: Extraocular movements intact.      Conjunctiva/sclera: Conjunctivae normal.      Pupils: Pupils are equal, round, and reactive to light.   Cardiovascular:      Rate and Rhythm: Normal rate and regular rhythm.   Pulmonary:      Breath sounds: Normal breath sounds.   Abdominal:      General: Bowel sounds are normal.      Palpations: Abdomen is soft.      Tenderness: There is no abdominal tenderness. There is no guarding or rebound.   Musculoskeletal:      Cervical back: Neck supple. No tenderness.   Lymphadenopathy:      Cervical: No cervical adenopathy. "   Skin:     General: Skin is warm and dry.   Neurological:      General: No focal deficit present.      Mental Status: She is alert and oriented to person, place, and time.   Psychiatric:         Mood and Affect: Mood normal.         Thought Content: Thought content normal.       Documentation written by Alexis Myhre, PA-MOHAMUD, under my supervision. I certify that her documentation is correct and appropriately represents the visit events.     Signed Electronically by: ANEUDY SARMIENTOC

## 2025-01-13 NOTE — PATIENT INSTRUCTIONS
Patient Education   Preventive Care Advice   This is general advice given by our system to help you stay healthy. However, your care team may have specific advice just for you. Please talk to your care team about your preventive care needs.  Nutrition  Eat 5 or more servings of fruits and vegetables each day.  Try wheat bread, brown rice and whole grain pasta (instead of white bread, rice, and pasta).  Get enough calcium and vitamin D. Check the label on foods and aim for 100% of the RDA (recommended daily allowance).  Lifestyle  Exercise at least 150 minutes each week  (30 minutes a day, 5 days a week).  Do muscle strengthening activities 2 days a week. These help control your weight and prevent disease.  No smoking.  Wear sunscreen to prevent skin cancer.  Have a dental exam and cleaning every 6 months.  Yearly exams  See your health care team every year to talk about:  Any changes in your health.  Any medicines your care team has prescribed.  Preventive care, family planning, and ways to prevent chronic diseases.  Shots (vaccines)   HPV shots (up to age 26), if you've never had them before.  Hepatitis B shots (up to age 59), if you've never had them before.  COVID-19 shot: Get this shot when it's due.  Flu shot: Get a flu shot every year.  Tetanus shot: Get a tetanus shot every 10 years.  Pneumococcal, hepatitis A, and RSV shots: Ask your care team if you need these based on your risk.  Shingles shot (for age 50 and up)  General health tests  Diabetes screening:  Starting at age 35, Get screened for diabetes at least every 3 years.  If you are younger than age 35, ask your care team if you should be screened for diabetes.  Cholesterol test: At age 39, start having a cholesterol test every 5 years, or more often if advised.  Bone density scan (DEXA): At age 50, ask your care team if you should have this scan for osteoporosis (brittle bones).  Hepatitis C: Get tested at least once in your life.  STIs (sexually  transmitted infections)  Before age 24: Ask your care team if you should be screened for STIs.  After age 24: Get screened for STIs if you're at risk. You are at risk for STIs (including HIV) if:  You are sexually active with more than one person.  You don't use condoms every time.  You or a partner was diagnosed with a sexually transmitted infection.  If you are at risk for HIV, ask about PrEP medicine to prevent HIV.  Get tested for HIV at least once in your life, whether you are at risk for HIV or not.  Cancer screening tests  Cervical cancer screening: If you have a cervix, begin getting regular cervical cancer screening tests starting at age 21.  Breast cancer scan (mammogram): If you've ever had breasts, begin having regular mammograms starting at age 40. This is a scan to check for breast cancer.  Colon cancer screening: It is important to start screening for colon cancer at age 45.  Have a colonoscopy test every 10 years (or more often if you're at risk) Or, ask your provider about stool tests like a FIT test every year or Cologuard test every 3 years.  To learn more about your testing options, visit:   .  For help making a decision, visit:   https://bit.ly/dw02820.  Prostate cancer screening test: If you have a prostate, ask your care team if a prostate cancer screening test (PSA) at age 55 is right for you.  Lung cancer screening: If you are a current or former smoker ages 50 to 80, ask your care team if ongoing lung cancer screenings are right for you.  For informational purposes only. Not to replace the advice of your health care provider. Copyright   2023 [employer]. All rights reserved. Clinically reviewed by the [facility] Transitions Program. [employer] 638887 - REV 01/24.

## 2025-01-13 NOTE — NURSING NOTE
I have place a  7 days  Zio Patch on this patient. Patient was given Zio Patch instructions and [employer] contact information. Patient understands when they should remove and return their monitor to [employer].     MIA Jonas

## 2025-01-18 ENCOUNTER — LAB (OUTPATIENT)
Dept: LAB | Facility: CLINIC | Age: 30
End: 2025-01-18
Payer: COMMERCIAL

## 2025-01-18 DIAGNOSIS — Z13.220 LIPID SCREENING: ICD-10-CM

## 2025-01-18 DIAGNOSIS — Z13.1 SCREENING FOR DIABETES MELLITUS: ICD-10-CM

## 2025-01-18 DIAGNOSIS — R00.2 PALPITATIONS: ICD-10-CM

## 2025-01-18 LAB
BASOPHILS # BLD AUTO: 0 10E3/UL (ref 0–0.2)
BASOPHILS NFR BLD AUTO: 0 %
CHOLEST SERPL-MCNC: 280 MG/DL
EOSINOPHIL # BLD AUTO: 0.1 10E3/UL (ref 0–0.7)
EOSINOPHIL NFR BLD AUTO: 1 %
ERYTHROCYTE [DISTWIDTH] IN BLOOD BY AUTOMATED COUNT: 11.9 % (ref 10–15)
EST. AVERAGE GLUCOSE BLD GHB EST-MCNC: 114 MG/DL
FASTING STATUS PATIENT QL REPORTED: YES
HBA1C MFR BLD: 5.6 % (ref 0–5.6)
HCT VFR BLD AUTO: 42.4 % (ref 35–47)
HDLC SERPL-MCNC: 42 MG/DL
HGB BLD-MCNC: 14 G/DL (ref 11.7–15.7)
IMM GRANULOCYTES # BLD: 0 10E3/UL
IMM GRANULOCYTES NFR BLD: 0 %
LDLC SERPL CALC-MCNC: 204 MG/DL
LYMPHOCYTES # BLD AUTO: 2.7 10E3/UL (ref 0.8–5.3)
LYMPHOCYTES NFR BLD AUTO: 25 %
MCH RBC QN AUTO: 28.9 PG (ref 26.5–33)
MCHC RBC AUTO-ENTMCNC: 33 G/DL (ref 31.5–36.5)
MCV RBC AUTO: 87 FL (ref 78–100)
MONOCYTES # BLD AUTO: 0.6 10E3/UL (ref 0–1.3)
MONOCYTES NFR BLD AUTO: 5 %
NEUTROPHILS # BLD AUTO: 7.5 10E3/UL (ref 1.6–8.3)
NEUTROPHILS NFR BLD AUTO: 68 %
NONHDLC SERPL-MCNC: 238 MG/DL
PLATELET # BLD AUTO: 318 10E3/UL (ref 150–450)
RBC # BLD AUTO: 4.85 10E6/UL (ref 3.8–5.2)
TRIGL SERPL-MCNC: 169 MG/DL
TSH SERPL DL<=0.005 MIU/L-ACNC: 2.49 UIU/ML (ref 0.3–4.2)
WBC # BLD AUTO: 10.9 10E3/UL (ref 4–11)

## 2025-01-18 PROCEDURE — 84443 ASSAY THYROID STIM HORMONE: CPT

## 2025-01-18 PROCEDURE — 85025 COMPLETE CBC W/AUTO DIFF WBC: CPT

## 2025-01-18 PROCEDURE — 83036 HEMOGLOBIN GLYCOSYLATED A1C: CPT

## 2025-01-18 PROCEDURE — 36415 COLL VENOUS BLD VENIPUNCTURE: CPT

## 2025-01-18 PROCEDURE — 80061 LIPID PANEL: CPT

## 2025-01-28 LAB — CV ZIO PRELIM RESULTS: NORMAL

## 2025-03-03 NOTE — PATIENT INSTRUCTIONS
If you have any questions regarding your visit, Please contact your care team.    [facility]: [phone]      [facility] CLINIC HOURS TELEPHONE NUMBER   Lisa Richard DO.    IFRAH Glass-Surgery Scheduler  Jacqueline - Surgery Scheduler    DAKOTA Montano RN Kylie, RN     Monday, Thursday  [place]  7am-3pm    Tuesday, Wednesday  [place]  7am-3pm    Friday  [place]  1pm-3:30pm    Typical Surgery Days: Thursday or Friday   [facility]  [address] [place] [address] [phone] Phone  [phone] Fax    [facility]  [address] [place] [address] [phone] Phone    Imaging [phone] Phone    [facility]:  [phone] Phone     **Surgeries** Our Surgery Schedulers will contact you to schedule. If you do not receive a call within 3 business days, please call [phone].    Urgent Care locations:  [facility] Saturday and    9 am - 5 pm    Monday-Friday   12 pm - 8 pm  Saturday and    9 am - 5 pm   [phone]       If you need a medication refill, please contact your pharmacy. Please allow 3 business days for your refill to be completed.  As always, Thank you for trusting us with your healthcare needs!

## 2025-03-05 ENCOUNTER — OFFICE VISIT (OUTPATIENT)
Dept: OBGYN | Facility: CLINIC | Age: 30
End: 2025-03-05
Payer: COMMERCIAL

## 2025-03-05 VITALS
WEIGHT: 228.2 LBS | BODY MASS INDEX: 36.28 KG/M2 | HEART RATE: 90 BPM | SYSTOLIC BLOOD PRESSURE: 103 MMHG | DIASTOLIC BLOOD PRESSURE: 78 MMHG

## 2025-03-05 DIAGNOSIS — N93.9 ABNORMAL UTERINE BLEEDING (AUB): Primary | ICD-10-CM

## 2025-03-05 DIAGNOSIS — R10.2 PELVIC PAIN IN FEMALE: ICD-10-CM

## 2025-03-05 DIAGNOSIS — Q51.818: ICD-10-CM

## 2025-03-05 DIAGNOSIS — N94.10 DYSPAREUNIA IN FEMALE: ICD-10-CM

## 2025-03-05 PROCEDURE — 3078F DIAST BP <80 MM HG: CPT | Performed by: OBSTETRICS & GYNECOLOGY

## 2025-03-05 PROCEDURE — 99214 OFFICE O/P EST MOD 30 MIN: CPT | Performed by: OBSTETRICS & GYNECOLOGY

## 2025-03-05 PROCEDURE — 3074F SYST BP LT 130 MM HG: CPT | Performed by: OBSTETRICS & GYNECOLOGY

## 2025-03-05 NOTE — PROGRESS NOTES
SUBJECTIVE:       HPI: Prachi Ross is a 29 year old  who presents today for consultation regarding dyspareunia, referral from Kerri Linares PA-C.    Patient c/o pelvic pain, located in the RLQ/pelvis that radiates up. Pain lasts 30 seconds and happens multiple times a day. Nothing makes the pain better. Nothing makes the pain worse. This has been going on for 3 months. She has tried - nothing.   Her pain is not cyclic.   STI history - none  Has been having pain with sex since she started having sex. Occurs with insertion and deeper penetration. Lubricants and position changes do not help. Has not changed with different partners.    She denies vaginal discharge. She denies fevers/chills, nausea/vomiting, abdominal pain or bloating.   Denies dysuria, hematuria, constipation or diarrhea.    Menses every 28 days, lasting 3 days. Flow is heavy. Has associated cramping.     She is using condoms for contraception.  Does not want any children.    Gyn Hx: Patient's last menstrual period was 2025 (exact date).     Last pap was  nil          reports that she has never smoked. She has never been exposed to tobacco smoke. She has never used smokeless tobacco.      Today's PHQ-2 Score:       2025     3:34 PM   PHQ-2 (  [employer])   Q1: Little interest or pleasure in doing things 0   Q2: Feeling down, depressed or hopeless 1   PHQ-2 Score 1    Q1: Little interest or pleasure in doing things Not at all   Q2: Feeling down, depressed or hopeless Several days   PHQ-2 Score 1       Patient-reported     Today's PHQ-9 Score:       2022     2:40 PM   PHQ-9 SCORE   PHQ-9 Total Score MyChart 14 (Moderate depression)   PHQ-9 Total Score 14     Today's CHITO-7 Score:        No data to display                Problem list and histories reviewed & adjusted, as indicated.  Additional history: as documented.    Patient Active Problem List   Diagnosis    Encounter for surveillance of injectable contraceptive     Mild intermittent asthma with acute exacerbation    Constipation, unspecified constipation type    External hemorrhoids    BMI 35.0-35.9,adult    Raynaud phenomenon     History reviewed. No pertinent surgical history.   Social History     Tobacco Use    Smoking status: Never     Passive exposure: Never    Smokeless tobacco: Never   Substance Use Topics    Alcohol use: Yes     Alcohol/week: 0.0 standard drinks of alcohol      Problem (# of Occurrences) Relation (Name,Age of Onset)    Diabetes (2) Mother, Father    Hypertension (1) Mother    Coronary Artery Disease (1) Maternal Grandmother           Negative family history of: Hyperlipidemia, Cerebrovascular Disease, Breast Cancer, Colon Cancer, Prostate Cancer, Other Cancer, Depression, Anxiety Disorder, Mental Illness, Substance Abuse, Anesthesia Reaction, Asthma, Osteoporosis, Genetic Disorder, Thyroid Disease, Obesity, Unknown/Adopted              Current Outpatient Medications   Medication Sig Dispense Refill    PROAIR  (90 Base) MCG/ACT inhaler Inhale 2 puffs into the lungs 3 times daily as needed for shortness of breath / dyspnea or wheezing 36 g 1     No current facility-administered medications for this visit.     No Known Allergies    ROS:  10 Point review of systems negative other noted above in HPI    OBJECTIVE:     /78   Pulse 90   Wt 103.5 kg (228 lb 3.2 oz)   LMP 02/11/2025 (Exact Date)   BMI 36.28 kg/m    Body mass index is 36.28 kg/m .      Gen: Alert, oriented, appropriately interactive, NAD  Resp: no audible wheeze, cough, or visible cyanosis.  No visible retractions or increased work of breathing.  Able to speak fully in complete sentences.  Pelvic- deferred to next visit  Neuro: Cranial nerves grossly intact, mentation intact and speech normal  Psych: mentation appears normal, affect normal/bright, judgement and insight intact, normal speech and appearance well-groomed        In-Clinic Test Results:  No results found for this or  any previous visit (from the past 24 hours).  Narrative & Impression   EXAMINATION: MR PELVIS (GYN) W/O & W CONTRAST, 10/19/2023 7:58  AM     COMPARISON: Pelvic ultrasound 3/27/2023     HISTORY: ovarian cyst; Adnexal cyst     TECHNIQUE: Multiplanar, multisequence imaging was obtained of the  pelvis without and with intravenous contrast. Contrast dose: 9.5mL of  [place]     FINDINGS:      Intrapelvic organs:  Retroflexed uterus. 1.3 cm intramural fibroid in the posterior mid  uterus. The endometrium measures approximately 8 mm in thickness.  Several small nabothian cysts.     Normal follicular architecture of the right ovary. Redemonstrated  thick-walled oval structure medial to the right ovary and inseparable  from the uterus measuring 2.2 x 1.7 cm, with central linear T1 and T2  hyperintensity (series 4, image 41 and series 3, image 22).     Left ovarian 4.4 x 4.6 cm simple fluid cyst without enhancing/solid  components or septations. There is normal-appearing left ovarian  parenchyma splayed at its periphery.     No abnormally dilated loops of bowel in the visualized pelvis. No  enlarged lymph nodes in the visualized portion of the pelvis. The  urinary bladder is within normal limits.     Bones and soft tissues:  No abnormal or suspicious osseous lesion. Normal marrow signal  pattern.                                                                      IMPRESSION:  1. Stable thick-walled oval structure medial to the right ovary and  inseparable from the uterus, measuring 2.2 x 1.7 cm, is of uncertain  etiology/origin. In addition to paraovarian cyst/endometriotic cyst,  differential diagnosis would include a noncommunicating rudimentary  uterine horn, given the central linear T1 and T2 hyperintensity which  is similar in appearance to the endometrium. Recommend follow up in  6-12 months.  2. Unchanged 1.5 cm intramural fibroid in the posterior mid uterus.  3. Unilocular left ovarian cyst is benign and no follow-up  is  required.     I have personally reviewed the examination and initial interpretation  and I agree with the findings.     DELMIS AUSTIN MD         SYSTEM ID:  [id number]     ASSESSMENT/PLAN:                                                      Prachi Ross is a 29 year old  who presents today for consultation for dyspareunia, referral from Kerri Linares PA-C.      ICD-10-CM    1. Abnormal uterine bleeding (AUB)  N93.9 Case Request: HYSTERECTOMY, ROBOT-ASSISTED, LAPAROSCOPIC, USING DA CHIQUITA XI  . bilateral salpingectomy     Case Request: HYSTERECTOMY, ROBOT-ASSISTED, LAPAROSCOPIC, USING DA CHIQUITA XI  . bilateral salpingectomy      2. Dyspareunia in female  N94.10 Ob/Gyn  Referral      3. 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 bilateral salpingectomy     Case Request: HYSTERECTOMY, ROBOT-ASSISTED, LAPAROSCOPIC, USING DA CHIQUITA XI  . bilateral salpingectomy      4. Pelvic pain in female  R10.2 Case Request: HYSTERECTOMY, ROBOT-ASSISTED, LAPAROSCOPIC, USING DA CHIQUITA XI  . bilateral salpingectomy     Case Request: HYSTERECTOMY, ROBOT-ASSISTED, LAPAROSCOPIC, USING DA CHIQUITA XI  . bilateral salpingectomy        AUB/HMB with chronic dyspareunia and new onset RLQ pain. Has no desire for fertility.   We discussed possible management options, including PT, medical and surgical therapy. Strongly recommend considering pelvic floor PT, but she declines at  this time; will consider for the future. After discussing options, she would like to proceed with MRI as recommended 4 months ago as well as definitive surgical management. Recommend robotic approach given likely uterine horn. Declines medication management. Details of the procedure were discussed with the patient.  Risks include, but are not limited to, bleeding, infection, and injury to surrounding organs such as the bowel, urinary system, nerves, and blood vessels.  Injury may  result in repair at the time of the surgery or in a separate procedure.  Cannot guarantee improvement in any pain condition with hysterectomy. Risks of early onset menopause and management options discussed. All questions answered, and accepting these risks, the patient elects to proceed with the procedure.   RTO for EMB, declines to perform today.  Hyst consent signed today.    30 minutes spent on the date of the encounter doing chart review, history and exam, documentation and further activities as noted above    Lisa Richard [facility]

## 2025-03-07 ENCOUNTER — TELEPHONE (OUTPATIENT)
Dept: OBGYN | Facility: CLINIC | Age: 30
End: 2025-03-07
Payer: COMMERCIAL

## 2025-03-07 NOTE — TELEPHONE ENCOUNTER
Associated Diagnoses    Abnormal uterine bleeding (AUB) [N93.9]  - Primary      Rudimentary uterine horn [Q51.818]      Pelvic pain in female [R10.2]        Source Order Set    Order Set Name Order ID    [id number]     Case Request: Case Info    Panel 1    Providers    Provider Role Service   Lisa Richard,  Primary Gynecology     Procedures    Procedure Laterality Anesthesia Region   HYSTERECTOMY, ROBOT-ASSISTED, LAPAROSCOPIC, USING DA CHIQUITA XI  . bilateral salpingectomy N/A General Abdomen                  Requested date:   Location:  OR   Patient class: Same Day Surgery      Pre-op diagnoses: Rudimentary uterine horn    Pelvic pain in female    Abnormal uterine bleeding (AUB)     Scheduling Instructions    Additional Instructions for the Case  Additional OR technician needed for assistance?:   Yes  Multi Surgeon Case No  H&P:  Pre-op options: PCP  Post-op:  4 weeks  Post-op Type:  Office Visit  Post-op Provider:  Dr Richard  Sterilization consent:  Yes and was signed on 3/5.  Vendor: No  Surgical time needed: Average  ERAS patient: Yes            SURGERY SCHEDULING AND PRECERTIFICATION    Medical Record Number: [id number]  Prachi Ross  YOB: 1995   Phone: [phone] (home)   Primary Provider: Tone Bhatia [employer] message of 3/12.  Patient would like to hold off for now.     Jacqueline Beal  /Surgery Scheduler

## 2025-03-07 NOTE — TELEPHONE ENCOUNTER
Patient is getting estimates of cost for the surgery.  Refer to the [employer] messages of 3/6.    She will call us back when she wants to proceed with scheduling.  If she wants just the tubal, a new case request will be made.     Jacqueline Beal  /Surgery Scheduler